# Patient Record
Sex: FEMALE | Race: WHITE | NOT HISPANIC OR LATINO
[De-identification: names, ages, dates, MRNs, and addresses within clinical notes are randomized per-mention and may not be internally consistent; named-entity substitution may affect disease eponyms.]

---

## 2021-06-04 ENCOUNTER — APPOINTMENT (OUTPATIENT)
Dept: UROGYNECOLOGY | Facility: CLINIC | Age: 26
End: 2021-06-04
Payer: COMMERCIAL

## 2021-06-04 ENCOUNTER — OUTPATIENT (OUTPATIENT)
Dept: OUTPATIENT SERVICES | Facility: HOSPITAL | Age: 26
LOS: 1 days | Discharge: HOME | End: 2021-06-04

## 2021-06-04 VITALS
HEIGHT: 63 IN | BODY MASS INDEX: 35.79 KG/M2 | WEIGHT: 202 LBS | DIASTOLIC BLOOD PRESSURE: 76 MMHG | SYSTOLIC BLOOD PRESSURE: 120 MMHG

## 2021-06-04 DIAGNOSIS — F19.90 OTHER PSYCHOACTIVE SUBSTANCE USE, UNSPECIFIED, UNCOMPLICATED: ICD-10-CM

## 2021-06-04 DIAGNOSIS — Z83.3 FAMILY HISTORY OF DIABETES MELLITUS: ICD-10-CM

## 2021-06-04 DIAGNOSIS — N89.8 OTHER SPECIFIED NONINFLAMMATORY DISORDERS OF VAGINA: ICD-10-CM

## 2021-06-04 DIAGNOSIS — Z82.49 FAMILY HISTORY OF ISCHEMIC HEART DISEASE AND OTHER DISEASES OF THE CIRCULATORY SYSTEM: ICD-10-CM

## 2021-06-04 DIAGNOSIS — K59.00 CONSTIPATION, UNSPECIFIED: ICD-10-CM

## 2021-06-04 DIAGNOSIS — F41.9 ANXIETY DISORDER, UNSPECIFIED: ICD-10-CM

## 2021-06-04 DIAGNOSIS — N31.9 NEUROMUSCULAR DYSFUNCTION OF BLADDER, UNSPECIFIED: ICD-10-CM

## 2021-06-04 DIAGNOSIS — Z86.69 PERSONAL HISTORY OF OTHER DISEASES OF THE NERVOUS SYSTEM AND SENSE ORGANS: ICD-10-CM

## 2021-06-04 DIAGNOSIS — L98.8 OTHER SPECIFIED DISORDERS OF THE SKIN AND SUBCUTANEOUS TISSUE: ICD-10-CM

## 2021-06-04 DIAGNOSIS — N39.46 MIXED INCONTINENCE: ICD-10-CM

## 2021-06-04 PROCEDURE — 99205 OFFICE O/P NEW HI 60 MIN: CPT | Mod: 25

## 2021-06-04 PROCEDURE — 51701 INSERT BLADDER CATHETER: CPT

## 2021-06-04 RX ORDER — TROSPIUM CHLORIDE 20 MG/1
20 TABLET, FILM COATED ORAL
Qty: 60 | Refills: 1 | Status: ACTIVE | COMMUNITY
Start: 2021-06-04 | End: 1900-01-01

## 2021-06-04 RX ORDER — NITROFURANTOIN MACROCRYSTAL 100 MG
100 CAPSULE ORAL
Refills: 0 | Status: ACTIVE | COMMUNITY

## 2021-06-05 NOTE — COUNSELING
[FreeTextEntry1] : \par Please stop the macrobid\par \par We will notify you of the vaginal culture results if they are abnormal\par \par Please do not start antibiotics for bacteria in the urine unless you have pain or burning or blood in the urine or an increase of incontinence or fevers\par \par For 4-5 days, cut one item from the list out of your diet.\par \par After 4-5 days, it it makes a difference, you have to decide if it is worth keeping it out of your diet to help with the urinary issues.\par \par If it does not make a difference, you should add it back into your diet and remove another item for another 4-5 days.\par \par Bowel recipe every night for stool control\par \par Please start the trospium (bladder medication) twice a day for the urgency and leakage without warning\par \par Please call my office if you have any issues with the cost or side effects of the medication.\par \par Please schedule a pessary fitting with my Janina RAMÍREZ\par \par Please schedule a 6 week med check with my PAJanina (YISSEL)\par \par Referral to gynecology upstairs\par Dr Gonzalez\par [facility]\par [address] [facility]\par \par

## 2021-06-05 NOTE — PHYSICAL EXAM
[Chaperone Present] : A chaperone was present in the examining room during all aspects of the physical examination [FreeTextEntry1] : Void:  unable to void (last cathed around 1.5 hours ago)\par Cath: 40  cc\par Urethra was prepped in sterile fashion and then a sterile catheter was used by me to drain the bladder.\par \par Currently has her menses\par Well healed incision: vertical\par absent perineal sensation\par absent perineal reflexes\par negative cough stress test\par negative atrophy\par no prolapse\par positive urethral hypermobility\par bilateral levator ani spasm,  no tenderness\par no urethral tenderness\par no bladder tenderness\par no cervical tenderness\par 1/5 Kegel\par

## 2021-06-05 NOTE — DISCUSSION/SUMMARY
[FreeTextEntry1] : \par Neurogenic bladder-\par Advised the patient to continue to self cath every 4 hours. Advised no treatment for bacteria in the urine unless she is symptomatic (pain, dysuria, hematuria, increase of incontinence, fevers). The patient voiced understanding and agrees to the plan.\par \par Mixed incontinence-\par The pathophysiology of the above condition was discussed with the patient. The management options for stress incontinence were discussed including observation, pessary placement, or surgery (midurethral sling). The patient voiced understanding and agrees with pessary management. She will be scheduled for a pessary fitting.\par \par The patient was given information and education on pelvic floor muscle exercises/rehabilitation, avoidance of dietary bladder irritants, and other strategies to improve bladder control, such as scheduled voiding. She was counseled regarding further management strategies for overactive bladder and urge incontinence including pelvic floor physical therapy, medications or surgical management. The patient voiced understanding and agrees with diet changes and medical management. The risks and benefits of trospium was reviewed.\par \par Constipation-\par The patient was counseled about her defecatory dysfunction. We discussed the importance of fiber, hydration and exercise. She was given a handout with specific information about dietary fiber and ways to relieve constipation.\par \par Vaginal discharge-\par Will send a vaginal culture for testing and will treat if indicated.\par

## 2021-06-05 NOTE — HISTORY OF PRESENT ILLNESS
[FreeTextEntry1] : \par Pt with pelvic floor dysfunction here for urogynecologic evaluation. 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 She then calls her primary who prescribed antibiotics. Is currently on Day 2/7 of macrobid.\par \par abusing xanax at this time, is going to go into rehab\par \par Pelvic organ prolapse: s/p MACE procedure, no bulge, denies splinting\par Stress urinary incontinence: as above\par Overactive bladder syndrome: as above, no glaucoma\par Lower urinary tract/vaginal symptoms: as above UTIs per year, no hematuria, no dysuria, no bladder pain \par Fecal incontinence: denies\par Defecatory dysfunction: as above\par Sexual dysfunction: denies pain, reports incontinence with penetration and orgasm, more with orgasm\par Pelvic pain: denies\par Vaginal dryness : +discharge but has her menses today\par \par Her pelvic floor symptoms are significantly bothersome and negatively impacting her quality of life. \par \par

## 2021-06-10 DIAGNOSIS — N89.8 OTHER SPECIFIED NONINFLAMMATORY DISORDERS OF VAGINA: ICD-10-CM

## 2021-06-10 DIAGNOSIS — N39.46 MIXED INCONTINENCE: ICD-10-CM

## 2021-06-10 DIAGNOSIS — N31.9 NEUROMUSCULAR DYSFUNCTION OF BLADDER, UNSPECIFIED: ICD-10-CM

## 2021-06-10 DIAGNOSIS — K59.00 CONSTIPATION, UNSPECIFIED: ICD-10-CM

## 2021-07-13 ENCOUNTER — APPOINTMENT (OUTPATIENT)
Dept: UROGYNECOLOGY | Facility: CLINIC | Age: 26
End: 2021-07-13

## 2021-07-23 ENCOUNTER — APPOINTMENT (OUTPATIENT)
Dept: UROGYNECOLOGY | Facility: CLINIC | Age: 26
End: 2021-07-23

## 2021-12-23 ENCOUNTER — OUTPATIENT (OUTPATIENT)
Dept: OUTPATIENT SERVICES | Facility: HOSPITAL | Age: 26
LOS: 1 days | Discharge: HOME | End: 2021-12-23
Payer: COMMERCIAL

## 2021-12-23 DIAGNOSIS — R22.0 LOCALIZED SWELLING, MASS AND LUMP, HEAD: ICD-10-CM

## 2021-12-23 PROCEDURE — 76536 US EXAM OF HEAD AND NECK: CPT | Mod: 26

## 2022-06-04 ENCOUNTER — OUTPATIENT (OUTPATIENT)
Dept: OUTPATIENT SERVICES | Facility: HOSPITAL | Age: 27
LOS: 1 days | Discharge: HOME | End: 2022-06-04
Payer: COMMERCIAL

## 2022-06-04 DIAGNOSIS — R33.9 RETENTION OF URINE, UNSPECIFIED: ICD-10-CM

## 2022-06-04 PROCEDURE — 76775 US EXAM ABDO BACK WALL LIM: CPT | Mod: 26

## 2022-08-04 ENCOUNTER — APPOINTMENT (OUTPATIENT)
Dept: CARDIOLOGY | Facility: CLINIC | Age: 27
End: 2022-08-04

## 2023-05-22 ENCOUNTER — EMERGENCY (EMERGENCY)
Facility: HOSPITAL | Age: 28
LOS: 0 days | Discharge: HOME | End: 2023-05-23
Attending: EMERGENCY MEDICINE
Payer: COMMERCIAL

## 2023-05-22 ENCOUNTER — EMERGENCY (EMERGENCY)
Facility: HOSPITAL | Age: 28
LOS: 0 days | Discharge: HOME | End: 2023-05-22
Attending: STUDENT IN AN ORGANIZED HEALTH CARE EDUCATION/TRAINING PROGRAM
Payer: COMMERCIAL

## 2023-05-22 VITALS
SYSTOLIC BLOOD PRESSURE: 127 MMHG | RESPIRATION RATE: 18 BRPM | TEMPERATURE: 98 F | HEART RATE: 70 BPM | DIASTOLIC BLOOD PRESSURE: 62 MMHG | OXYGEN SATURATION: 99 %

## 2023-05-22 VITALS
RESPIRATION RATE: 19 BRPM | HEART RATE: 90 BPM | WEIGHT: 250 LBS | DIASTOLIC BLOOD PRESSURE: 86 MMHG | SYSTOLIC BLOOD PRESSURE: 136 MMHG | TEMPERATURE: 98 F | OXYGEN SATURATION: 99 %

## 2023-05-22 DIAGNOSIS — Z87.448 PERSONAL HISTORY OF OTHER DISEASES OF URINARY SYSTEM: ICD-10-CM

## 2023-05-22 DIAGNOSIS — M79.672 PAIN IN LEFT FOOT: ICD-10-CM
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
## 2023-05-22 DIAGNOSIS — S99.812A OTHER SPECIFIED INJURIES OF LEFT ANKLE, INITIAL ENCOUNTER: ICD-10-CM
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
## 2023-05-22 DIAGNOSIS — M25.572 PAIN IN LEFT ANKLE AND JOINTS OF LEFT FOOT: ICD-10-CM

## 2023-05-22 PROCEDURE — 99053 MED SERV 10PM-8AM 24 HR FAC: CPT

## 2023-05-22 PROCEDURE — 73630 X-RAY EXAM OF FOOT: CPT | Mod: 26,LT

## 2023-05-22 PROCEDURE — 99284 EMERGENCY DEPT VISIT MOD MDM: CPT | Mod: 25

## 2023-05-22 PROCEDURE — 73630 X-RAY EXAM OF FOOT: CPT | Mod: LT

## 2023-05-22 PROCEDURE — 73610 X-RAY EXAM OF ANKLE: CPT | Mod: 26,LT

## 2023-05-22 PROCEDURE — 73610 X-RAY EXAM OF ANKLE: CPT | Mod: LT

## 2023-05-22 PROCEDURE — 99283 EMERGENCY DEPT VISIT LOW MDM: CPT

## 2023-05-22 PROCEDURE — 99284 EMERGENCY DEPT VISIT MOD MDM: CPT

## 2023-05-22 PROCEDURE — 73630 X-RAY EXAM OF FOOT: CPT | Mod: 26,LT,76

## 2023-05-22 PROCEDURE — 96372 THER/PROPH/DIAG INJ SC/IM: CPT | Mod: XU

## 2023-05-22 PROCEDURE — 29515 APPLICATION SHORT LEG SPLINT: CPT | Mod: LT

## 2023-05-22 RX ORDER — KETOROLAC TROMETHAMINE 30 MG/ML
30 SYRINGE (ML) INJECTION ONCE
Refills: 0 | Status: DISCONTINUED | OUTPATIENT
Start: 2023-05-22 | End: 2023-05-22

## 2023-05-22 RX ORDER — ACETAMINOPHEN 500 MG
975 TABLET ORAL ONCE
Refills: 0 | Status: COMPLETED | OUTPATIENT
Start: 2023-05-22 | End: 2023-05-22

## 2023-05-22 RX ADMIN — Medication 30 MILLIGRAM(S): at 23:12

## 2023-05-22 RX ADMIN — Medication 975 MILLIGRAM(S): at 03:19

## 2023-05-22 NOTE — ED PROVIDER NOTE - NSICDXPASTSURGICALHX_GEN_ALL_CORE_FT
PAST SURGICAL HISTORY:  Cystic teratoma 1993    Villafuerte procedure 2006    Tethered spinal cord 11/1993

## 2023-05-22 NOTE — ED PROVIDER NOTE - NSICDXPASTMEDICALHX_GEN_ALL_CORE_FT
PAST MEDICAL HISTORY:  Neurogenic bladder self straight cath 4 times a day    Neurogenic bowel s/p galloway procedure, gives self enema every other day    Pilonidal cyst

## 2023-05-22 NOTE — ED PROVIDER NOTE - PROGRESS NOTE DETAILS
Pt aware that we will have official radiology read pending, and may result in change of xray read.  Pt voices understanding of use of medications, instructions for care, and reasons to return or to go to ED  Discussed rest, ice, compression, and elevation with patient.   Discussed NSAIDs for anti-inflammatory and pain relief.  Patient advised to f/u with ortho/podiatry

## 2023-05-22 NOTE — ED PROVIDER NOTE - CLINICAL SUMMARY MEDICAL DECISION MAKING FREE TEXT BOX
PAST MEDICAL HISTORY:  Asthma as a child    Hypothyroid     Morbid obesity 28 yr old f that presents with L foot pain. imaging, will place in hard sole shoe,. will dc pt with orthopedic follow up and strict return precautions.  Imaging was ordered and reviewed by me.  Appropriate medications for patient's presenting complaints were ordered and effects were reassessed.  Patient's records (prior hospital, ED visit, and/or nursing home notes if available) were reviewed.  Additional history was obtained from EMS, family, and/or PCP (where available).  Escalation to admission/observation was considered.  However patient feels much better and is comfortable with discharge.  Appropriate follow-up was arranged.     I have discussed the discharge plan with the patient. The patient agrees with the plan, as discussed.  The patient understands Emergency Department diagnosis is a preliminary diagnosis often based on limited information and that the patient must adhere to the follow-up plan as discussed.  The patient understands that if the symptoms worsen or if prescribed medications do not have the desired/planned effect that the patient may return to the Emergency Department at any time for further evaluation and treatment.

## 2023-05-22 NOTE — ED PROVIDER NOTE - PATIENT PORTAL LINK FT
You can access the FollowMyHealth Patient Portal offered by [facility] by registering at the following website: [email or web address]. By joining [facility]’s FollowMyHealth portal, you will also be able to view your health information using other applications (apps) compatible with our system.

## 2023-05-22 NOTE — ED ADULT NURSE NOTE - NSFALLRISKINTERV_ED_ALL_ED

## 2023-05-22 NOTE — ED PROVIDER NOTE - WORK/EXCUSE FORM DATE
Medical Necessity Clause: This procedure was medically necessary because the lesions that were treated were: 24-May-2023

## 2023-05-22 NOTE — ED PROVIDER NOTE - NS ED ATTENDING STATEMENT MOD
This was a shared visit with the OWEN. I reviewed and verified the documentation and independently performed the documented:

## 2023-05-22 NOTE — ED PROVIDER NOTE - CLINICAL SUMMARY MEDICAL DECISION MAKING FREE TEXT BOX
Patient presents with left foot pain. xray shows lisfranc fracture. Podiatry consulted and recommending outpatient surgery. Splint applied. Discharged with podiatry follow up and return precautions.
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
## 2023-05-22 NOTE — ED PROVIDER NOTE - PROGRESS NOTE DETAILS
Podiatry has been consulted and evaluated patient.  Patient was given option to be admitted to the hospital for surgery or follow-up with podiatry outpatient for surgery outpatient.  Patient wants to go home and follow-up with podiatry outpatient.  Posterior leg splint applied.  Patient is stable for discharge.

## 2023-05-22 NOTE — ED PROVIDER NOTE - PHYSICAL EXAMINATION
CONSTITUTIONAL: Well-appearing; well-nourished; in no apparent distress.   NECK: Supple; non-tender; no cervical lymphadenopathy.   CARDIOVASCULAR: Normal S1, S2; no murmurs, rubs, or gallops.   RESPIRATORY: Normal chest excursion with respiration  GI/: Non-distended; non-tender; no palpable organomegaly.   MS: ttp over medial L foot and lateral L ankle; No evidence of trauma or deformity. Normal ROM in all other joints/extremities and otherwise non-tender to palpation; distal pulses are normal.   SKIN: Normal for age and race; warm; dry; good turgor; no apparent lesions or exudate.   NEURO/PSYCH: A & O x 4; grossly unremarkable. mood and manner are appropriate.

## 2023-05-22 NOTE — ED PROVIDER NOTE - NSFOLLOWUPCLINICS_GEN_ALL_ED_FT
[facility]  Orthpedic  [address], [place]   Phone: [phone]  Fax:     [facility]  Podiatry  .  [place]   Phone: [phone]  Fax:
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
## 2023-05-22 NOTE — ED PROVIDER NOTE - PATIENT PORTAL LINK FT
You can access the FollowMyHealth Patient Portal offered by [facility] by registering at the following website: [email or web address]. By joining [facility]’s FollowMyHealth portal, you will also be able to view your health information using other applications (apps) compatible with our system.

## 2023-05-22 NOTE — ED PROVIDER NOTE - NSFOLLOWUPCLINICS_GEN_ALL_ED_FT
[facility]  Podiatry  .  [place]   Phone: [phone]  Fax:   Follow Up Time: 1-3 Days
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
## 2023-05-22 NOTE — ED PROVIDER NOTE - PHYSICAL EXAMINATION
CONSTITUTIONAL: in no apparent distress.   ENT: Hearing is intact with good acuity to spoken voice.  Patient is speaking clearly, not muffled and airway is intact.   RESPIRATORY: No signs of respiratory distress.   CARDIOVASCULAR: Regular rate and rhythm.   MS: L foot with no obvious deformity, but swelling noticed along with ecchymosis; tender to palpation in the metatarsal area; full ROM; sensory function intact; distal pulse present. Rest of the upper and lower extremities unremarkable. NEURO: A & O x 3. Normal speech. No focal deficit.  PSYCHOLOGICAL: Appropriate mood and affect. Good judgement and insight.

## 2023-05-22 NOTE — ED PROVIDER NOTE - NSFOLLOWUPINSTRUCTIONS_ED_ALL_ED_FT
Please make sure to follow up with your primary care doctor in 3 days.    Our Emergency Department Referral Coordinators will be reaching out ot you in the next 24-48 hours from 9:00am to 5:00pm (Monday to Friday) with a follow up appointment. Please expect a phone call from the hospital in that time frame. If you do not receive a call or if you have any questions or concerns, you can reach them at [phone] or [phone].
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
## 2023-05-23 NOTE — ED ADULT NURSE NOTE - NSFALLUNIVINTERV_ED_ALL_ED
Bed/Stretcher in lowest position, wheels locked, appropriate side rails in place/Call bell, personal items and telephone in reach/Instruct patient to call for assistance before getting out of bed/chair/stretcher/Non-slip footwear applied when patient is off stretcher/[place] to call system/Physically safe environment - no spills, clutter or unnecessary equipment/Purposeful proactive rounding/Room/bathroom lighting operational, light cord in reach

## 2023-05-28 ENCOUNTER — TRANSCRIPTION ENCOUNTER (OUTPATIENT)
Age: 28
End: 2023-05-28

## 2023-05-30 ENCOUNTER — APPOINTMENT (OUTPATIENT)
Dept: PODIATRY | Facility: CLINIC | Age: 28
End: 2023-05-30

## 2023-05-31 ENCOUNTER — NON-APPOINTMENT (OUTPATIENT)
Age: 28
End: 2023-05-31

## 2023-06-01 ENCOUNTER — OUTPATIENT (OUTPATIENT)
Dept: OUTPATIENT SERVICES | Facility: HOSPITAL | Age: 28
LOS: 1 days | End: 2023-06-01
Payer: MEDICAID

## 2023-06-01 ENCOUNTER — APPOINTMENT (OUTPATIENT)
Dept: PODIATRY | Facility: CLINIC | Age: 28
End: 2023-06-01
Payer: MEDICAID

## 2023-06-01 VITALS
BODY MASS INDEX: 40.48 KG/M2 | HEART RATE: 80 BPM | WEIGHT: 220 LBS | DIASTOLIC BLOOD PRESSURE: 67 MMHG | SYSTOLIC BLOOD PRESSURE: 113 MMHG | HEIGHT: 62 IN
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
## 2023-06-01 DIAGNOSIS — Z00.00 ENCOUNTER FOR GENERAL ADULT MEDICAL EXAMINATION WITHOUT ABNORMAL FINDINGS: ICD-10-CM

## 2023-06-01 DIAGNOSIS — S93.325A DISLOCATION OF TARSOMETATARSAL JOINT OF LEFT FOOT, INITIAL ENCOUNTER: ICD-10-CM

## 2023-06-01 PROCEDURE — 99204 OFFICE O/P NEW MOD 45 MIN: CPT | Mod: GC

## 2023-06-01 PROCEDURE — 99204 OFFICE O/P NEW MOD 45 MIN: CPT

## 2023-06-01 NOTE — PHYSICAL EXAM
[General Appearance - Alert] : alert [General Appearance - In No Acute Distress] : in no acute distress [General Appearance - Well Nourished] : well nourished [General Appearance - Well Developed] : well developed [Ankle Swelling (On Exam)] : present [Ankle Swelling On The Left] : of the left ankle [Varicose Veins Of The Left Leg] : on the left foot/ankle [2+] : left foot dorsalis pedis 2+ [Skin Color & Pigmentation] : normal skin color and pigmentation [Skin Turgor] : normal skin turgor [Skin Lesions] : no skin lesions [Sensation] : the sensory exam was normal to light touch and pinprick [Varicose Veins Of Lower Extremities] : not present [] : not present [Delayed in the Left Toes] : capillary refills normal in the left toes [de-identified] : No gross skeletal deformities\par -Pain with passive forefoot abduction\par -Mild ain with passive 1st ray dorsiflexion/plantarflexion\par -TTP 2nd TMTJ, Lisfranc joint, MT heads 1-3, Left foot  [Foot Ulcer] : no foot ulcer [Skin Induration] : no skin induration [FreeTextEntry1] : M

## 2023-06-01 NOTE — ASSESSMENT
[Verbal] : verbal [Patient] : patient [Good - alert, interested, motivated] : Good - alert, interested, motivated [Demonstrates independently] : demonstrates independently [FreeTextEntry1] : Assessment:\par -Lisfranc fracture, Left\par \par Plan:\par -Pt seen & evaluated\par -CT-L foot -> sx planning, eval for comminution\par -Sx scheduled 6/31/23, ORIF Lisfranc L foot, possible 1st/2nd TMTJ arthrodesis. \par -Risks, benefits, and alternatives discussed w/pt. Pt agrees to proceed w/sx.\par -No earlier sx availability. Will attempt to reschedule if times are available\par -WBAT-L in supportive shoegear\par -RTC 1 week post-op

## 2023-06-01 NOTE — REASON FOR VISIT
[Initial Visit] : an initial visit for [Confirmed Foot Fracture] : confirmed foot fracture [Parent] : parent

## 2023-06-01 NOTE — REVIEW OF SYSTEMS
[Limb Pain] : limb pain [Limb Swelling] : limb swelling [Difficulty Walking] : difficulty walking [Negative] : Integumentary [Arthralgias] : no arthralgias [Joint Swelling] : no joint swelling [Joint Stiffness] : no joint stiffness [Limb Weakness] : no limb weakness

## 2023-06-01 NOTE — END OF VISIT
[] : Resident [FreeTextEntry3] : patient here for management of left foot lisfranc dislocation w/ 2nd metatarsal base fracture. Patient presented to the ED 5/22 but declined admission for ORIF and preferred outpatient follow up. Patient was d/c with posterior splint and crutches. Patient presents without crutches and splint; patient fully weightbearing with sandals. Reports pain with WB.\par xrays reviewed and discussed with patient\par Patient requires ORIF for lisfranc dislocation. Risks of surgery discussed. Patient was informed that she may need repeat surgery for hardware removal or an arthrodesis if she has persistent post op pain.\par rx for rolling knee walker\par pt should remain NWB until surgery\par referred for CT scan\par pt scheduled for for 6/21. WIll r/s for an earlier date pending OR availability
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
## 2023-06-02 DIAGNOSIS — S93.325A DISLOCATION OF TARSOMETATARSAL JOINT OF LEFT FOOT, INITIAL ENCOUNTER: ICD-10-CM

## 2023-06-02 DIAGNOSIS — M79.672 PAIN IN LEFT FOOT: ICD-10-CM

## 2023-06-08 ENCOUNTER — OUTPATIENT (OUTPATIENT)
Dept: OUTPATIENT SERVICES | Facility: HOSPITAL | Age: 28
LOS: 1 days | End: 2023-06-08
Payer: MEDICAID

## 2023-06-08 VITALS
SYSTOLIC BLOOD PRESSURE: 127 MMHG | HEART RATE: 89 BPM | WEIGHT: 213.85 LBS | HEIGHT: 61 IN | DIASTOLIC BLOOD PRESSURE: 76 MMHG | TEMPERATURE: 98 F | RESPIRATION RATE: 16 BRPM | OXYGEN SATURATION: 98 %

## 2023-06-08 DIAGNOSIS — Z01.818 ENCOUNTER FOR OTHER PREPROCEDURAL EXAMINATION: ICD-10-CM

## 2023-06-08 DIAGNOSIS — S93.324A DISLOCATION OF TARSOMETATARSAL JOINT OF RIGHT FOOT, INITIAL ENCOUNTER: ICD-10-CM

## 2023-06-08 DIAGNOSIS — S93.325D DISLOCATION OF TARSOMETATARSAL JOINT OF LEFT FOOT, SUBSEQUENT ENCOUNTER: ICD-10-CM

## 2023-06-08 LAB
ALBUMIN SERPL ELPH-MCNC: 4.8 G/DL — SIGNIFICANT CHANGE UP (ref 3.5–5.2)
ALP SERPL-CCNC: 85 U/L — SIGNIFICANT CHANGE UP (ref 30–115)
ALT FLD-CCNC: 87 U/L — HIGH (ref 0–41)
ANION GAP SERPL CALC-SCNC: 14 MMOL/L — SIGNIFICANT CHANGE UP (ref 7–14)
APPEARANCE UR: ABNORMAL
AST SERPL-CCNC: 34 U/L — SIGNIFICANT CHANGE UP (ref 0–41)
BACTERIA # UR AUTO: ABNORMAL
BASOPHILS # BLD AUTO: 0.04 K/UL — SIGNIFICANT CHANGE UP (ref 0–0.2)
BASOPHILS NFR BLD AUTO: 0.3 % — SIGNIFICANT CHANGE UP (ref 0–1)
BILIRUB SERPL-MCNC: 1.7 MG/DL — HIGH (ref 0.2–1.2)
BILIRUB UR-MCNC: NEGATIVE — SIGNIFICANT CHANGE UP
BUN SERPL-MCNC: 14 MG/DL — SIGNIFICANT CHANGE UP (ref 10–20)
CALCIUM SERPL-MCNC: 9.9 MG/DL — SIGNIFICANT CHANGE UP (ref 8.4–10.5)
CHLORIDE SERPL-SCNC: 103 MMOL/L — SIGNIFICANT CHANGE UP (ref 98–110)
CO2 SERPL-SCNC: 21 MMOL/L — SIGNIFICANT CHANGE UP (ref 17–32)
COLOR SPEC: YELLOW — SIGNIFICANT CHANGE UP
CREAT SERPL-MCNC: 0.7 MG/DL — SIGNIFICANT CHANGE UP (ref 0.7–1.5)
DIFF PNL FLD: ABNORMAL
EGFR: 119 ML/MIN/1.73M2 — SIGNIFICANT CHANGE UP
EOSINOPHIL # BLD AUTO: 0.21 K/UL — SIGNIFICANT CHANGE UP (ref 0–0.7)
EOSINOPHIL NFR BLD AUTO: 1.6 % — SIGNIFICANT CHANGE UP (ref 0–8)
EPI CELLS # UR: >27 /HPF — HIGH (ref 0–5)
GLUCOSE SERPL-MCNC: 97 MG/DL — SIGNIFICANT CHANGE UP (ref 70–99)
GLUCOSE UR QL: NEGATIVE — SIGNIFICANT CHANGE UP
HCT VFR BLD CALC: 40.7 % — SIGNIFICANT CHANGE UP (ref 37–47)
HGB BLD-MCNC: 12.9 G/DL — SIGNIFICANT CHANGE UP (ref 12–16)
HYALINE CASTS # UR AUTO: 18 /LPF — HIGH (ref 0–7)
IMM GRANULOCYTES NFR BLD AUTO: 0.8 % — HIGH (ref 0.1–0.3)
KETONES UR-MCNC: NEGATIVE — SIGNIFICANT CHANGE UP
LEUKOCYTE ESTERASE UR-ACNC: ABNORMAL
LYMPHOCYTES # BLD AUTO: 17 % — LOW (ref 20.5–51.1)
LYMPHOCYTES # BLD AUTO: 2.25 K/UL — SIGNIFICANT CHANGE UP (ref 1.2–3.4)
MCHC RBC-ENTMCNC: 24.9 PG — LOW (ref 27–31)
MCHC RBC-ENTMCNC: 31.7 G/DL — LOW (ref 32–37)
MCV RBC AUTO: 78.6 FL — LOW (ref 81–99)
MONOCYTES # BLD AUTO: 0.9 K/UL — HIGH (ref 0.1–0.6)
MONOCYTES NFR BLD AUTO: 6.8 % — SIGNIFICANT CHANGE UP (ref 1.7–9.3)
NEUTROPHILS # BLD AUTO: 9.77 K/UL — HIGH (ref 1.4–6.5)
NEUTROPHILS NFR BLD AUTO: 73.5 % — SIGNIFICANT CHANGE UP (ref 42.2–75.2)
NITRITE UR-MCNC: POSITIVE
NRBC # BLD: 0 /100 WBCS — SIGNIFICANT CHANGE UP (ref 0–0)
PH UR: 6 — SIGNIFICANT CHANGE UP (ref 5–8)
PLATELET # BLD AUTO: 387 K/UL — SIGNIFICANT CHANGE UP (ref 130–400)
PMV BLD: 12.6 FL — HIGH (ref 7.4–10.4)
POTASSIUM SERPL-MCNC: 4.4 MMOL/L — SIGNIFICANT CHANGE UP (ref 3.5–5)
POTASSIUM SERPL-SCNC: 4.4 MMOL/L — SIGNIFICANT CHANGE UP (ref 3.5–5)
PROT SERPL-MCNC: 8.1 G/DL — HIGH (ref 6–8)
PROT UR-MCNC: ABNORMAL
RBC # BLD: 5.18 M/UL — SIGNIFICANT CHANGE UP (ref 4.2–5.4)
RBC # FLD: 14.6 % — HIGH (ref 11.5–14.5)
RBC CASTS # UR COMP ASSIST: 6 /HPF — HIGH (ref 0–4)
SODIUM SERPL-SCNC: 138 MMOL/L — SIGNIFICANT CHANGE UP (ref 135–146)
SP GR SPEC: 1.03 — SIGNIFICANT CHANGE UP (ref 1.01–1.03)
UROBILINOGEN FLD QL: SIGNIFICANT CHANGE UP
WBC # BLD: 13.27 K/UL — HIGH (ref 4.8–10.8)
WBC # FLD AUTO: 13.27 K/UL — HIGH (ref 4.8–10.8)
WBC UR QL: >720 /HPF — HIGH (ref 0–5)

## 2023-06-08 PROCEDURE — 36415 COLL VENOUS BLD VENIPUNCTURE: CPT

## 2023-06-08 PROCEDURE — 85025 COMPLETE CBC W/AUTO DIFF WBC: CPT

## 2023-06-08 PROCEDURE — 93005 ELECTROCARDIOGRAM TRACING: CPT

## 2023-06-08 PROCEDURE — 87186 SC STD MICRODIL/AGAR DIL: CPT

## 2023-06-08 PROCEDURE — 87086 URINE CULTURE/COLONY COUNT: CPT

## 2023-06-08 PROCEDURE — 81001 URINALYSIS AUTO W/SCOPE: CPT

## 2023-06-08 PROCEDURE — 80053 COMPREHEN METABOLIC PANEL: CPT

## 2023-06-08 PROCEDURE — 93010 ELECTROCARDIOGRAM REPORT: CPT

## 2023-06-08 PROCEDURE — 97116 GAIT TRAINING THERAPY: CPT | Mod: GP

## 2023-06-08 PROCEDURE — 99214 OFFICE O/P EST MOD 30 MIN: CPT | Mod: 25

## 2023-06-08 NOTE — H&P PST ADULT - NSICDXPASTMEDICALHX_GEN_ALL_CORE_FT
PAST MEDICAL HISTORY:  Anxiety and depression     Hypertension     Lisfranc dislocation     Neurogenic bladder self straight cath 4 times a day    Neurogenic bowel s/p galloway procedure, gives self enema every other day    Pilonidal cyst
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
## 2023-06-08 NOTE — H&P PST ADULT - REASON FOR ADMISSION
27 y/o female presents to PAST in preparation for ORIF of lisfranc injury left foot in [facility] under GA with Dr. Edwards on 6/15/23

## 2023-06-09 DIAGNOSIS — Z01.818 ENCOUNTER FOR OTHER PREPROCEDURAL EXAMINATION: ICD-10-CM

## 2023-06-09 DIAGNOSIS — S93.325D DISLOCATION OF TARSOMETATARSAL JOINT OF LEFT FOOT, SUBSEQUENT ENCOUNTER: ICD-10-CM

## 2023-06-09 PROBLEM — F41.9 ANXIETY DISORDER, UNSPECIFIED: Chronic | Status: ACTIVE | Noted: 2023-06-08

## 2023-06-09 PROBLEM — S93.326A: Chronic | Status: ACTIVE | Noted: 2023-06-08

## 2023-06-09 PROBLEM — I10 ESSENTIAL (PRIMARY) HYPERTENSION: Chronic | Status: ACTIVE | Noted: 2023-06-08

## 2023-06-10 ENCOUNTER — OUTPATIENT (OUTPATIENT)
Dept: OUTPATIENT SERVICES | Facility: HOSPITAL | Age: 28
LOS: 1 days | End: 2023-06-10
Payer: MEDICAID

## 2023-06-10 ENCOUNTER — RESULT REVIEW (OUTPATIENT)
Age: 28
End: 2023-06-10

## 2023-06-10 DIAGNOSIS — Z00.8 ENCOUNTER FOR OTHER GENERAL EXAMINATION: ICD-10-CM

## 2023-06-10 DIAGNOSIS — M79.673 PAIN IN UNSPECIFIED FOOT: ICD-10-CM

## 2023-06-10 PROCEDURE — 73700 CT LOWER EXTREMITY W/O DYE: CPT | Mod: 26,LT

## 2023-06-10 PROCEDURE — 73700 CT LOWER EXTREMITY W/O DYE: CPT | Mod: LT

## 2023-06-11 DIAGNOSIS — M79.673 PAIN IN UNSPECIFIED FOOT: ICD-10-CM

## 2023-06-12 DIAGNOSIS — N39.0 URINARY TRACT INFECTION, SITE NOT SPECIFIED: ICD-10-CM

## 2023-06-13 ENCOUNTER — NON-APPOINTMENT (OUTPATIENT)
Age: 28
End: 2023-06-13

## 2023-06-14 RX ORDER — AMOXICILLIN 500 MG/1
500 TABLET, FILM COATED ORAL EVERY 8 HOURS
Qty: 21 | Refills: 0 | Status: ACTIVE | COMMUNITY
Start: 2023-06-14 | End: 1900-01-01

## 2023-06-20 ENCOUNTER — APPOINTMENT (OUTPATIENT)
Dept: PODIATRY | Facility: CLINIC | Age: 28
End: 2023-06-20
Payer: SELF-PAY

## 2023-06-20 ENCOUNTER — OUTPATIENT (OUTPATIENT)
Dept: OUTPATIENT SERVICES | Facility: HOSPITAL | Age: 28
LOS: 1 days | End: 2023-06-20

## 2023-06-20 DIAGNOSIS — S93.325D DISLOCATION OF TARSOMETATARSAL JOINT OF LEFT FOOT, SUBSEQUENT ENCOUNTER: ICD-10-CM

## 2023-06-20 DIAGNOSIS — N39.0 URINARY TRACT INFECTION, SITE NOT SPECIFIED: ICD-10-CM

## 2023-06-20 PROCEDURE — 99214 OFFICE O/P EST MOD 30 MIN: CPT | Mod: 57

## 2023-06-20 NOTE — REASON FOR VISIT
[Follow-Up Visit] : a follow-up visit for [Confirmed Foot Fracture] : confirmed foot fracture [Parent] : parent

## 2023-06-20 NOTE — PHYSICAL EXAM
[General Appearance - Alert] : alert [General Appearance - In No Acute Distress] : in no acute distress [General Appearance - Well Nourished] : well nourished [General Appearance - Well Developed] : well developed [Ankle Swelling (On Exam)] : present [Ankle Swelling On The Left] : of the left ankle [Ankle Swelling On The Right] : mild [Varicose Veins Of Lower Extremities] : not present [Varicose Veins Of The Left Leg] : on the left foot/ankle [Delayed in the Left Toes] : capillary refills normal in the left toes [2+] : left foot dorsalis pedis 2+ [de-identified] : No gross skeletal deformities\par -Pain with passive forefoot abduction. pain with piano key test at the midtarsal joint L foot. pain with ROM and palpation on dorsal and plantar midfoot L foot. \par -Mild ain with passive 1st ray dorsiflexion/plantarflexion\par -TTP 2nd TMTJ, Lisfranc joint, MT heads 1-3, Left foot  [Skin Color & Pigmentation] : normal skin color and pigmentation [Skin Turgor] : normal skin turgor [] : no rash [Skin Lesions] : no skin lesions [Foot Ulcer] : no foot ulcer [Skin Induration] : no skin induration [Sensation] : the sensory exam was normal to light touch and pinprick

## 2023-06-20 NOTE — ASSESSMENT
[FreeTextEntry1] : Assessment:\par -Lisfranc fracture/injury , Left\par \par Plan:\par -Pt seen & evaluated\par -CT-L foot -> sx planning, eval for comminution\par -Sx scheduled 6/23/23, ORIF Lisfranc L foot, possible 1st/2nd TMTJ arthrodesis. \par -Risks, benefits, and alternatives discussed w/pt. Pt agrees to proceed w/sx.\par -Cont with Abx, f/u with urine cultures \par -NWB LLE in crutches \par -RTC 1 week post-op\par Patient requires ORIF for lisfranc dislocation. All, benefits, Risks and potential complications of surgery discussed. potential complications include but not limited to long term pain, infection, need for revision, etc.  Patient was informed that she may need repeat surgery for hardware removal or an arthrodesis if she has persistent post op pain.\par rx for rolling knee walker\par pt should remain NWB until surgery\par  [Verbal] : verbal [Patient] : patient [Good - alert, interested, motivated] : Good - alert, interested, motivated [Demonstrates independently] : demonstrates independently
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
## 2023-06-20 NOTE — REVIEW OF SYSTEMS
[Arthralgias] : no arthralgias [Joint Swelling] : no joint swelling [Joint Stiffness] : no joint stiffness [Limb Pain] : limb pain [Limb Swelling] : limb swelling [Limb Weakness] : no limb weakness [Difficulty Walking] : difficulty walking [Negative] : Integumentary

## 2023-06-21 DIAGNOSIS — N39.0 URINARY TRACT INFECTION, SITE NOT SPECIFIED: ICD-10-CM

## 2023-06-23 ENCOUNTER — OUTPATIENT (OUTPATIENT)
Dept: INPATIENT UNIT | Facility: HOSPITAL | Age: 28
LOS: 1 days | Discharge: HOME | End: 2023-06-23
Payer: MEDICAID

## 2023-06-23 ENCOUNTER — TRANSCRIPTION ENCOUNTER (OUTPATIENT)
Age: 28
End: 2023-06-23

## 2023-06-23 VITALS
TEMPERATURE: 96 F | SYSTOLIC BLOOD PRESSURE: 104 MMHG | HEIGHT: 61 IN | HEART RATE: 81 BPM | WEIGHT: 212.97 LBS | OXYGEN SATURATION: 98 % | DIASTOLIC BLOOD PRESSURE: 68 MMHG | RESPIRATION RATE: 18 BRPM

## 2023-06-23 VITALS — HEART RATE: 74 BPM | RESPIRATION RATE: 17 BRPM | DIASTOLIC BLOOD PRESSURE: 96 MMHG | SYSTOLIC BLOOD PRESSURE: 136 MMHG

## 2023-06-23 DIAGNOSIS — S93.325A DISLOCATION OF TARSOMETATARSAL JOINT OF LEFT FOOT, INITIAL ENCOUNTER: ICD-10-CM

## 2023-06-23 PROCEDURE — C9399: CPT

## 2023-06-23 PROCEDURE — 29515 APPLICATION SHORT LEG SPLINT: CPT | Mod: LT,GC,59

## 2023-06-23 PROCEDURE — C1713: CPT

## 2023-06-23 PROCEDURE — 73620 X-RAY EXAM OF FOOT: CPT | Mod: LT

## 2023-06-23 PROCEDURE — 28730 FUSION OF FOOT BONES: CPT | Mod: LT,GC

## 2023-06-23 PROCEDURE — C1769: CPT

## 2023-06-23 PROCEDURE — 76000 FLUOROSCOPY <1 HR PHYS/QHP: CPT | Mod: 26,GC

## 2023-06-23 RX ORDER — ONDANSETRON 8 MG/1
4 TABLET, FILM COATED ORAL ONCE
Refills: 0 | Status: DISCONTINUED | OUTPATIENT
Start: 2023-06-23 | End: 2023-06-23

## 2023-06-23 RX ORDER — SODIUM CHLORIDE 9 MG/ML
1000 INJECTION, SOLUTION INTRAVENOUS
Refills: 0 | Status: DISCONTINUED | OUTPATIENT
Start: 2023-06-23 | End: 2023-06-23

## 2023-06-23 RX ORDER — OXYCODONE AND ACETAMINOPHEN 5; 325 MG/1; MG/1
5-325 TABLET ORAL
Qty: 28 | Refills: 0 | Status: ACTIVE | COMMUNITY
Start: 2023-06-23 | End: 1900-01-01

## 2023-06-23 RX ORDER — HYDROMORPHONE HYDROCHLORIDE 2 MG/ML
0.5 INJECTION INTRAMUSCULAR; INTRAVENOUS; SUBCUTANEOUS
Refills: 0 | Status: DISCONTINUED | OUTPATIENT
Start: 2023-06-23 | End: 2023-06-23

## 2023-06-23 RX ORDER — AMOXICILLIN AND CLAVULANATE POTASSIUM 875; 125 MG/1; MG/1
875-125 TABLET, COATED ORAL
Qty: 14 | Refills: 0 | Status: ACTIVE | COMMUNITY
Start: 2023-06-23 | End: 1900-01-01

## 2023-06-23 RX ORDER — OXYCODONE AND ACETAMINOPHEN 5; 325 MG/1; MG/1
1 TABLET ORAL ONCE
Refills: 0 | Status: DISCONTINUED | OUTPATIENT
Start: 2023-06-23 | End: 2023-06-23

## 2023-06-23 RX ORDER — HYDROMORPHONE HYDROCHLORIDE 2 MG/ML
1 INJECTION INTRAMUSCULAR; INTRAVENOUS; SUBCUTANEOUS
Refills: 0 | Status: DISCONTINUED | OUTPATIENT
Start: 2023-06-23 | End: 2023-06-23

## 2023-06-23 RX ORDER — OXYCODONE AND ACETAMINOPHEN 5; 325 MG/1; MG/1
2 TABLET ORAL ONCE
Refills: 0 | Status: DISCONTINUED | OUTPATIENT
Start: 2023-06-23 | End: 2023-06-23

## 2023-06-23 NOTE — ASU PATIENT PROFILE, ADULT - NSICDXPASTSURGICALHX_GEN_ALL_CORE_FT
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

## 2023-06-23 NOTE — ASU PATIENT PROFILE, ADULT - NSICDXPASTMEDICALHX_GEN_ALL_CORE_FT
PAST MEDICAL HISTORY:  Anxiety and depression     Hypertension     Lisfranc dislocation     Neurogenic bladder self straight cath 4 times a day    Neurogenic bowel s/p galloway procedure, gives self enema every other day via umbilicus    Pilonidal cyst

## 2023-06-23 NOTE — ASU DISCHARGE PLAN (ADULT/PEDIATRIC) - CALL YOUR DOCTOR IF YOU HAVE ANY OF THE FOLLOWING:
Bleeding that does not stop/Swelling that gets worse/Pain not relieved by Medications/Fever greater than (need to indicate Fahrenheit or Celsius)/Unable to urinate/Excessive diarrhea/Inability to tolerate liquids or foods Bleeding that does not stop/Swelling that gets worse/Pain not relieved by Medications/Fever greater than (need to indicate Fahrenheit or Celsius)/Wound/Surgical Site with redness, or foul smelling discharge or pus/Numbness, tingling, color or temperature change to extremity/Nausea and vomiting that does not stop/Unable to urinate/Excessive diarrhea/Inability to tolerate liquids or foods/Increased irritability or sluggishness

## 2023-06-23 NOTE — ASU PATIENT PROFILE, ADULT - ABILITY TO HEAR (WITH HEARING AID OR HEARING APPLIANCE IF NORMALLY USED):
46M w pmhx of DM, HTN, HLD, gout, sleep apnea (CPAP at night) with perforated acute cholecystitis. SICU consulted for sepsis management requiring pressor support.     Plan:  Neuro  - Pain control as needed: IV Tylenol and Toradol    Respiratory: Sleep apnea (CPAP HS)  - IS  - Monitor Spo2  - CPAP overnight for ROCHELLE    Cardiovascular: Sepsis  - Monitor hemodynamic status and markers of perfusion  - Pressor support for hypotension with Levo Vaso; MAP goal over 65    GI: Acute cholecystitis with perforation   - Diet, NPO  - IR consulted to evaluate for Perchole tube; drained 50cc purulent material; daily flush      - Monitor and replete electrolytes as needed  - IV resuscitation with 2x LR bolus and 1L Plasmalyte  - IVF with plasmalyte @ 125  - Monitor I&O; making adequate urine  - Daily weights    Heme  - Monitor H&H; Transfuse as needed <7  - Holding DVT ppx given possible IR intervention    ID: Acute cholecystitis with perforation   - Given Ertapenem in ED x1, changed to Zosyn  - Trend WBC and monitor fever curve  - F/U CX    Endo: DM  - Monitor glucose; maintain under 180 and avoid hypoglycemia  insulin lispro (ADMELOG) corrective regimen sliding scale   every 6 hours    Dispo: Continued SICU monitoring   Adequate: hears normal conversation without difficulty

## 2023-06-23 NOTE — ASU PREOP CHECKLIST - 1.
PT HAS NEUROGENIC BLADDER & SELF CATHS 4X/DAY. PT HAS NEUROGENIC BOWL & SELF ENEMAS EVERY OTHER DAY VIA UMBILICUS.

## 2023-06-26 LAB
APPEARANCE: CLEAR
BILIRUBIN URINE: NEGATIVE
BLOOD URINE: NEGATIVE
COLOR: YELLOW
GLUCOSE QUALITATIVE U: NEGATIVE MG/DL
KETONES URINE: NEGATIVE MG/DL
LEUKOCYTE ESTERASE URINE: NEGATIVE
NITRITE URINE: NEGATIVE
PH URINE: 6
PROTEIN URINE: NEGATIVE MG/DL
SPECIFIC GRAVITY URINE: 1.01
UROBILINOGEN URINE: 0.2 MG/DL

## 2023-06-27 DIAGNOSIS — F41.9 ANXIETY DISORDER, UNSPECIFIED: ICD-10-CM
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
## 2023-06-27 DIAGNOSIS — S93.325A DISLOCATION OF TARSOMETATARSAL JOINT OF LEFT FOOT, INITIAL ENCOUNTER: ICD-10-CM
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
## 2023-06-27 DIAGNOSIS — I10 ESSENTIAL (PRIMARY) HYPERTENSION: ICD-10-CM
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
## 2023-06-27 DIAGNOSIS — F32.9 MAJOR DEPRESSIVE DISORDER, SINGLE EPISODE, UNSPECIFIED: ICD-10-CM
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
## 2023-06-28 ENCOUNTER — APPOINTMENT (OUTPATIENT)
Dept: PODIATRY | Facility: CLINIC | Age: 28
End: 2023-06-28

## 2023-06-29 ENCOUNTER — APPOINTMENT (OUTPATIENT)
Dept: PODIATRY | Facility: CLINIC | Age: 28
End: 2023-06-29
Payer: SELF-PAY

## 2023-06-29 PROCEDURE — 99213 OFFICE O/P EST LOW 20 MIN: CPT | Mod: 24

## 2023-06-29 RX ORDER — OXYCODONE AND ACETAMINOPHEN 5; 325 MG/1; MG/1
5-325 TABLET ORAL
Qty: 6 | Refills: 0 | Status: ACTIVE | COMMUNITY
Start: 2023-06-29 | End: 1900-01-01

## 2023-06-29 NOTE — ASSESSMENT
[FreeTextEntry1] : S/p Lisfranc Fusion L Foot - no sings of infection \par Cont NWB\par Cont Abx \par RICE Protocol\par pain management with pain meds\par \par PRessure sore L Heel\par - WOund care with bacitracin and Xeroform\par - Heel off loading \par \par F/u in 1 week \par

## 2023-06-29 NOTE — HISTORY OF PRESENT ILLNESS
[FreeTextEntry1] : S/p Lisfranc Fusion L Foot\par - NWB LLE in a Scooter\par - Pain L foot and L heel\par - COntrolled with pain meds\par - On abx

## 2023-06-29 NOTE — PHYSICAL EXAM
[Ankle Swelling (On Exam)] : present [Ankle Swelling On The Left] : moderate [Delayed in the Left Toes] : capillary refills normal in the left toes [de-identified] : Mild pain with Foot ROM L FOot [FreeTextEntry1] : Surgical site clean, sutures intact. No drainage, no erythema\par Blister L anterior ankle with breakdown of the skin\par Unstagable L heel wound, ischemic base, Size 2cm x 2cm, painful  [Sensation] : the sensory exam was normal to light touch and pinprick [Motor Exam] : the motor exam was normal

## 2023-07-06 ENCOUNTER — APPOINTMENT (OUTPATIENT)
Dept: PODIATRY | Facility: CLINIC | Age: 28
End: 2023-07-06
Payer: MEDICAID

## 2023-07-06 VITALS — BODY MASS INDEX: 40.48 KG/M2 | WEIGHT: 220 LBS | HEIGHT: 62 IN | TEMPERATURE: 97 F

## 2023-07-06 PROCEDURE — 99213 OFFICE O/P EST LOW 20 MIN: CPT | Mod: 24

## 2023-07-06 NOTE — ASSESSMENT
[FreeTextEntry1] : S/p Lisfranc Fusion L Foot - no sings of infection \par Cont NWB\par Rx CAM boot \par Rx Xray\par Rx Wound SUpplies \par pain management with pain meds\par \par PRessure sore L Heel - improving \par - WOund care with bacitracin and Xeroform 2 times a week\par - Heel off loading \par \par F/u in 2 weeks to remove sutures or sooner of any problems arise \par

## 2023-07-06 NOTE — PHYSICAL EXAM
[Ankle Swelling (On Exam)] : present [Ankle Swelling On The Left] : moderate [Delayed in the Left Toes] : capillary refills normal in the left toes [de-identified] : Mild pain with Foot ROM L FOot [FreeTextEntry1] : Surgical site clean, sutures intact. No drainage, no erythema\par Blister L anterior ankle with breakdown of the skin\par Unstagable L heel wound, ischemic base, Size 2cm x 2cm, painful  [Sensation] : the sensory exam was normal to light touch and pinprick [Motor Exam] : the motor exam was normal

## 2023-07-06 NOTE — HISTORY OF PRESENT ILLNESS
[FreeTextEntry1] : S/p Lisfranc Fusion L Foot\par - NWB LLE in a Scooter\par - mild Pain L foot and L heel

## 2023-07-25 ENCOUNTER — APPOINTMENT (OUTPATIENT)
Dept: PODIATRY | Facility: CLINIC | Age: 28
End: 2023-07-25
Payer: MEDICAID

## 2023-07-25 ENCOUNTER — RESULT REVIEW (OUTPATIENT)
Age: 28
End: 2023-07-25

## 2023-07-25 ENCOUNTER — OUTPATIENT (OUTPATIENT)
Dept: OUTPATIENT SERVICES | Facility: HOSPITAL | Age: 28
LOS: 1 days | End: 2023-07-25
Payer: MEDICAID

## 2023-07-25 DIAGNOSIS — Z98.890 OTHER SPECIFIED POSTPROCEDURAL STATES: ICD-10-CM

## 2023-07-25 DIAGNOSIS — L89.620 PRESSURE ULCER OF LEFT HEEL, UNSTAGEABLE: ICD-10-CM

## 2023-07-25 DIAGNOSIS — M79.672 PAIN IN LEFT FOOT: ICD-10-CM

## 2023-07-25 PROCEDURE — 73630 X-RAY EXAM OF FOOT: CPT | Mod: LT

## 2023-07-25 PROCEDURE — 73630 X-RAY EXAM OF FOOT: CPT | Mod: 26,LT

## 2023-07-25 PROCEDURE — 99213 OFFICE O/P EST LOW 20 MIN: CPT | Mod: 24

## 2023-07-25 RX ORDER — GAUZE BANDAGE 4.5"X147"
BANDAGE TOPICAL
Qty: 1 | Refills: 2 | Status: ACTIVE | COMMUNITY
Start: 2023-07-25 | End: 1900-01-01

## 2023-07-25 RX ORDER — ADHESIVE TAPE 3"X 2.3 YD
4"X4" TAPE, NON-MEDICATED TOPICAL
Qty: 1 | Refills: 2 | Status: ACTIVE | COMMUNITY
Start: 2023-07-25 | End: 1900-01-01

## 2023-07-25 RX ORDER — COLLAGENASE SANTYL 250 [ARB'U]/G
250 OINTMENT TOPICAL DAILY
Qty: 1 | Refills: 4 | Status: ACTIVE | COMMUNITY
Start: 2023-07-25

## 2023-07-25 RX ORDER — COMPR.STOCKING,KNEE,LONG,LARGE
EACH MISCELLANEOUS
Qty: 3 | Refills: 0 | Status: ACTIVE | COMMUNITY
Start: 2023-07-25 | End: 1900-01-01

## 2023-07-25 RX ORDER — COLLAGENASE SANTYL 250 [ARB'U]/G
250 OINTMENT TOPICAL DAILY
Qty: 1 | Refills: 4 | Status: ACTIVE | COMMUNITY
Start: 2023-07-25 | End: 1900-01-01

## 2023-07-25 NOTE — ASSESSMENT
[FreeTextEntry1] : S/p Lisfranc Fusion L Foot - no sings of infection \par - Cont NWB with CAM boot \par - Xray Reviewed with the patient\par - Sutures Removed \par \par PRessure sore L Heel \par - Rx Wound SUpplies \par - Rx Santyl \par - WOund care with Santyl /WTD Daily\par - Heel off loading \par \par F/u in 2 weeks or sooner of any problems arise \par

## 2023-07-25 NOTE — PHYSICAL EXAM
[Ankle Swelling (On Exam)] : present [Ankle Swelling On The Left] : of the left ankle [Ankle Swelling On The Right] : mild [Delayed in the Left Toes] : capillary refills normal in the left toes [de-identified] : Mild pain with Foot ROM L FOot and palpation ovet the incision sites  [FreeTextEntry1] : Surgical site clean, sutures intact. No drainage, no erythema\par Unstagable L heel wound, ischemic base, Size 2cm x 2cm, painful  [Sensation] : the sensory exam was normal to light touch and pinprick [Motor Exam] : the motor exam was normal

## 2023-07-25 NOTE — HISTORY OF PRESENT ILLNESS
[FreeTextEntry1] : S/p Lisfranc Fusion L Foot\par - NWB LLE in a Scooter\par - mild Pain L foot and L heel \par - L Heel pressure wound, dressing at home

## 2023-07-26 DIAGNOSIS — M79.672 PAIN IN LEFT FOOT: ICD-10-CM

## 2023-08-08 ENCOUNTER — APPOINTMENT (OUTPATIENT)
Dept: PODIATRY | Facility: CLINIC | Age: 28
End: 2023-08-08
Payer: MEDICAID

## 2023-08-08 VITALS
DIASTOLIC BLOOD PRESSURE: 70 MMHG | HEART RATE: 108 BPM | SYSTOLIC BLOOD PRESSURE: 118 MMHG | BODY MASS INDEX: 40.48 KG/M2 | OXYGEN SATURATION: 98 % | HEIGHT: 62 IN | WEIGHT: 220 LBS

## 2023-08-08 DIAGNOSIS — L03.116 CELLULITIS OF LEFT LOWER LIMB: ICD-10-CM

## 2023-08-08 PROCEDURE — 11042 DBRDMT SUBQ TIS 1ST 20SQCM/<: CPT | Mod: 78

## 2023-08-08 PROCEDURE — 99024 POSTOP FOLLOW-UP VISIT: CPT

## 2023-08-08 RX ORDER — SULFAMETHOXAZOLE AND TRIMETHOPRIM 800; 160 MG/1; MG/1
800-160 TABLET ORAL TWICE DAILY
Qty: 14 | Refills: 0 | Status: ACTIVE | COMMUNITY
Start: 2023-08-08 | End: 1900-01-01

## 2023-08-08 NOTE — ASSESSMENT
[FreeTextEntry1] : S/p Lisfranc Fusion L Foot - no sings of infection  - Transition to WBAT with CAM boot   PRessure sore L Heel  - Sharp Ex Dbx of soft tissue dwon and including subcutaneous tissue L Heel  - WOund care with Santyl /WTD Daily - Heel off loading  Cellulites  - Rx Abx Pt to be admitted to the hospital for Surgical management of the wound. Discussed all risks, benefits, alternatives and complications. Pt agrees to the plan [Verbal] : verbal [Patient] : patient [Family member] : family member [Good - alert, interested, motivated] : Good - alert, interested, motivated [Demonstrates independently] : demonstrates independently [Dressing changes] : dressing changes [Foot Care] : foot care

## 2023-08-08 NOTE — PHYSICAL EXAM
[Ankle Swelling (On Exam)] : present [Ankle Swelling On The Left] : of the left ankle [Ankle Swelling On The Right] : mild [Delayed in the Left Toes] : capillary refills normal in the left toes [de-identified] : Mild pain with Foot ROM L FOot and palpation ovet the incision sites  [FreeTextEntry1] : Surgical site clean, sutures intact. No drainage, no erythema Unstagable L heel wound, ischemic base, Size 2cm x 2cm, painful. Erythema on the dayday wound [Sensation] : the sensory exam was normal to light touch and pinprick [Motor Exam] : the motor exam was normal

## 2023-08-08 NOTE — HISTORY OF PRESENT ILLNESS
[FreeTextEntry1] : S/p Lisfranc Fusion L Foot - NWB LLE in a Scooter - mild Pain L foot and L heel  - L Heel pressure wound, dressing at home

## 2023-08-13 ENCOUNTER — INPATIENT (INPATIENT)
Facility: HOSPITAL | Age: 28
LOS: 6 days | Discharge: HOME HEALTH | End: 2023-08-20
Attending: HOSPITALIST | Admitting: HOSPITALIST
Payer: MEDICAID

## 2023-08-13 VITALS
DIASTOLIC BLOOD PRESSURE: 82 MMHG | TEMPERATURE: 98 F | OXYGEN SATURATION: 95 % | RESPIRATION RATE: 18 BRPM | SYSTOLIC BLOOD PRESSURE: 114 MMHG | WEIGHT: 220.02 LBS | HEIGHT: 61 IN | HEART RATE: 103 BPM

## 2023-08-13 DIAGNOSIS — L03.90 CELLULITIS, UNSPECIFIED: ICD-10-CM

## 2023-08-13 LAB
ALBUMIN SERPL ELPH-MCNC: 4.8 G/DL — SIGNIFICANT CHANGE UP (ref 3.5–5.2)
ALP SERPL-CCNC: 74 U/L — SIGNIFICANT CHANGE UP (ref 30–115)
ALT FLD-CCNC: 15 U/L — SIGNIFICANT CHANGE UP (ref 0–41)
ANION GAP SERPL CALC-SCNC: 13 MMOL/L — SIGNIFICANT CHANGE UP (ref 7–14)
APTT BLD: 33.2 SEC — SIGNIFICANT CHANGE UP (ref 27–39.2)
AST SERPL-CCNC: 16 U/L — SIGNIFICANT CHANGE UP (ref 0–41)
BASOPHILS # BLD AUTO: 0.06 K/UL — SIGNIFICANT CHANGE UP (ref 0–0.2)
BASOPHILS NFR BLD AUTO: 0.6 % — SIGNIFICANT CHANGE UP (ref 0–1)
BILIRUB SERPL-MCNC: 0.7 MG/DL — SIGNIFICANT CHANGE UP (ref 0.2–1.2)
BLD GP AB SCN SERPL QL: SIGNIFICANT CHANGE UP
BUN SERPL-MCNC: 12 MG/DL — SIGNIFICANT CHANGE UP (ref 10–20)
CALCIUM SERPL-MCNC: 9.9 MG/DL — SIGNIFICANT CHANGE UP (ref 8.4–10.4)
CHLORIDE SERPL-SCNC: 103 MMOL/L — SIGNIFICANT CHANGE UP (ref 98–110)
CO2 SERPL-SCNC: 20 MMOL/L — SIGNIFICANT CHANGE UP (ref 17–32)
CREAT SERPL-MCNC: 1 MG/DL — SIGNIFICANT CHANGE UP (ref 0.7–1.5)
CRP SERPL-MCNC: <3 MG/L — SIGNIFICANT CHANGE UP
EGFR: 78 ML/MIN/1.73M2 — SIGNIFICANT CHANGE UP
EOSINOPHIL # BLD AUTO: 0.37 K/UL — SIGNIFICANT CHANGE UP (ref 0–0.7)
EOSINOPHIL NFR BLD AUTO: 3.9 % — SIGNIFICANT CHANGE UP (ref 0–8)
ERYTHROCYTE [SEDIMENTATION RATE] IN BLOOD: 5 MM/HR — SIGNIFICANT CHANGE UP (ref 0–20)
GLUCOSE SERPL-MCNC: 92 MG/DL — SIGNIFICANT CHANGE UP (ref 70–99)
HCT VFR BLD CALC: 42.1 % — SIGNIFICANT CHANGE UP (ref 37–47)
HGB BLD-MCNC: 13.4 G/DL — SIGNIFICANT CHANGE UP (ref 12–16)
IMM GRANULOCYTES NFR BLD AUTO: 0.4 % — HIGH (ref 0.1–0.3)
INR BLD: 1.03 RATIO — SIGNIFICANT CHANGE UP (ref 0.65–1.3)
LACTATE SERPL-SCNC: 1.6 MMOL/L — SIGNIFICANT CHANGE UP (ref 0.7–2)
LYMPHOCYTES # BLD AUTO: 1.83 K/UL — SIGNIFICANT CHANGE UP (ref 1.2–3.4)
LYMPHOCYTES # BLD AUTO: 19.2 % — LOW (ref 20.5–51.1)
MAGNESIUM SERPL-MCNC: 1.9 MG/DL — SIGNIFICANT CHANGE UP (ref 1.8–2.4)
MCHC RBC-ENTMCNC: 24.6 PG — LOW (ref 27–31)
MCHC RBC-ENTMCNC: 31.8 G/DL — LOW (ref 32–37)
MCV RBC AUTO: 77.4 FL — LOW (ref 81–99)
MONOCYTES # BLD AUTO: 0.61 K/UL — HIGH (ref 0.1–0.6)
MONOCYTES NFR BLD AUTO: 6.4 % — SIGNIFICANT CHANGE UP (ref 1.7–9.3)
NEUTROPHILS # BLD AUTO: 6.64 K/UL — HIGH (ref 1.4–6.5)
NEUTROPHILS NFR BLD AUTO: 69.5 % — SIGNIFICANT CHANGE UP (ref 42.2–75.2)
NRBC # BLD: 0 /100 WBCS — SIGNIFICANT CHANGE UP (ref 0–0)
PLATELET # BLD AUTO: 275 K/UL — SIGNIFICANT CHANGE UP (ref 130–400)
PMV BLD: 13.4 FL — HIGH (ref 7.4–10.4)
POTASSIUM SERPL-MCNC: 4.7 MMOL/L — SIGNIFICANT CHANGE UP (ref 3.5–5)
POTASSIUM SERPL-SCNC: 4.7 MMOL/L — SIGNIFICANT CHANGE UP (ref 3.5–5)
PROT SERPL-MCNC: 7.5 G/DL — SIGNIFICANT CHANGE UP (ref 6–8)
PROTHROM AB SERPL-ACNC: 11.8 SEC — SIGNIFICANT CHANGE UP (ref 9.95–12.87)
RBC # BLD: 5.44 M/UL — HIGH (ref 4.2–5.4)
RBC # FLD: 13.9 % — SIGNIFICANT CHANGE UP (ref 11.5–14.5)
SODIUM SERPL-SCNC: 136 MMOL/L — SIGNIFICANT CHANGE UP (ref 135–146)
WBC # BLD: 9.55 K/UL — SIGNIFICANT CHANGE UP (ref 4.8–10.8)
WBC # FLD AUTO: 9.55 K/UL — SIGNIFICANT CHANGE UP (ref 4.8–10.8)

## 2023-08-13 PROCEDURE — 86901 BLOOD TYPING SEROLOGIC RH(D): CPT

## 2023-08-13 PROCEDURE — 86850 RBC ANTIBODY SCREEN: CPT

## 2023-08-13 PROCEDURE — 80048 BASIC METABOLIC PNL TOTAL CA: CPT

## 2023-08-13 PROCEDURE — 88304 TISSUE EXAM BY PATHOLOGIST: CPT

## 2023-08-13 PROCEDURE — 87075 CULTR BACTERIA EXCEPT BLOOD: CPT

## 2023-08-13 PROCEDURE — 99221 1ST HOSP IP/OBS SF/LOW 40: CPT | Mod: GC,57,24

## 2023-08-13 PROCEDURE — 87015 SPECIMEN INFECT AGNT CONCNTJ: CPT

## 2023-08-13 PROCEDURE — 87070 CULTURE OTHR SPECIMN AEROBIC: CPT

## 2023-08-13 PROCEDURE — G0447 BEHAVIOR COUNSEL OBESITY 15M: CPT

## 2023-08-13 PROCEDURE — 93926 LOWER EXTREMITY STUDY: CPT | Mod: 26,LT

## 2023-08-13 PROCEDURE — 83735 ASSAY OF MAGNESIUM: CPT

## 2023-08-13 PROCEDURE — 99285 EMERGENCY DEPT VISIT HI MDM: CPT

## 2023-08-13 PROCEDURE — 87116 MYCOBACTERIA CULTURE: CPT

## 2023-08-13 PROCEDURE — 85730 THROMBOPLASTIN TIME PARTIAL: CPT

## 2023-08-13 PROCEDURE — 85027 COMPLETE CBC AUTOMATED: CPT

## 2023-08-13 PROCEDURE — 86900 BLOOD TYPING SEROLOGIC ABO: CPT

## 2023-08-13 PROCEDURE — 93971 EXTREMITY STUDY: CPT | Mod: LT

## 2023-08-13 PROCEDURE — 99223 1ST HOSP IP/OBS HIGH 75: CPT

## 2023-08-13 PROCEDURE — 36415 COLL VENOUS BLD VENIPUNCTURE: CPT

## 2023-08-13 PROCEDURE — 87206 SMEAR FLUORESCENT/ACID STAI: CPT

## 2023-08-13 PROCEDURE — 93926 LOWER EXTREMITY STUDY: CPT | Mod: LT

## 2023-08-13 PROCEDURE — 85610 PROTHROMBIN TIME: CPT

## 2023-08-13 PROCEDURE — 87077 CULTURE AEROBIC IDENTIFY: CPT

## 2023-08-13 PROCEDURE — 87102 FUNGUS ISOLATION CULTURE: CPT

## 2023-08-13 PROCEDURE — 84703 CHORIONIC GONADOTROPIN ASSAY: CPT

## 2023-08-13 PROCEDURE — 85025 COMPLETE CBC W/AUTO DIFF WBC: CPT

## 2023-08-13 PROCEDURE — 73630 X-RAY EXAM OF FOOT: CPT | Mod: 26,LT

## 2023-08-13 PROCEDURE — 93971 EXTREMITY STUDY: CPT | Mod: 26,LT

## 2023-08-13 PROCEDURE — 87186 SC STD MICRODIL/AGAR DIL: CPT

## 2023-08-13 PROCEDURE — 71045 X-RAY EXAM CHEST 1 VIEW: CPT

## 2023-08-13 RX ORDER — CEFEPIME 1 G/1
1000 INJECTION, POWDER, FOR SOLUTION INTRAMUSCULAR; INTRAVENOUS ONCE
Refills: 0 | Status: DISCONTINUED | OUTPATIENT
Start: 2023-08-13 | End: 2023-08-13

## 2023-08-13 RX ORDER — MORPHINE SULFATE 50 MG/1
4 CAPSULE, EXTENDED RELEASE ORAL ONCE
Refills: 0 | Status: DISCONTINUED | OUTPATIENT
Start: 2023-08-13 | End: 2023-08-13

## 2023-08-13 RX ORDER — SODIUM CHLORIDE 9 MG/ML
1000 INJECTION, SOLUTION INTRAVENOUS ONCE
Refills: 0 | Status: COMPLETED | OUTPATIENT
Start: 2023-08-13 | End: 2023-08-13

## 2023-08-13 RX ORDER — ESCITALOPRAM OXALATE 10 MG/1
1 TABLET, FILM COATED ORAL
Refills: 0 | DISCHARGE

## 2023-08-13 RX ORDER — COLLAGENASE CLOSTRIDIUM HIST. 250 UNIT/G
1 OINTMENT (GRAM) TOPICAL ONCE
Refills: 0 | Status: COMPLETED | OUTPATIENT
Start: 2023-08-13 | End: 2023-08-13

## 2023-08-13 RX ORDER — HYDROXYZINE HCL 10 MG
1 TABLET ORAL
Refills: 0 | DISCHARGE

## 2023-08-13 RX ORDER — CHLORHEXIDINE GLUCONATE 213 G/1000ML
1 SOLUTION TOPICAL
Refills: 0 | Status: DISCONTINUED | OUTPATIENT
Start: 2023-08-13 | End: 2023-08-14

## 2023-08-13 RX ORDER — ENOXAPARIN SODIUM 100 MG/ML
40 INJECTION SUBCUTANEOUS EVERY 24 HOURS
Refills: 0 | Status: DISCONTINUED | OUTPATIENT
Start: 2023-08-13 | End: 2023-08-14

## 2023-08-13 RX ORDER — CEFAZOLIN SODIUM 1 G
2000 VIAL (EA) INJECTION ONCE
Refills: 0 | Status: COMPLETED | OUTPATIENT
Start: 2023-08-13 | End: 2023-08-13

## 2023-08-13 RX ORDER — HYDROXYZINE HCL 10 MG
50 TABLET ORAL AT BEDTIME
Refills: 0 | Status: DISCONTINUED | OUTPATIENT
Start: 2023-08-13 | End: 2023-08-14

## 2023-08-13 RX ADMIN — SODIUM CHLORIDE 1000 MILLILITER(S): 9 INJECTION, SOLUTION INTRAVENOUS at 15:04

## 2023-08-13 RX ADMIN — Medication 100 MILLIGRAM(S): at 15:04

## 2023-08-13 NOTE — ED ADULT NURSE NOTE - PRIMARY CARE PROVIDER
-- DO NOT REPLY / DO NOT REPLY ALL --  -- Message is from Engagement Center Operations (ECO) --    General Patient Message: [facility] called and would like order placed for One Touch Ultra Blue test strips and lancets. Pharmacy has been faxing form over but as of today it has not gone through to office.        Alternative phone number:     Can a detailed message be left? Yes    Message Turnaround:     Is it Working Hours? Yes - Working Hours     [place]:    Please give this turnaround time to the caller:   \"This message will be sent to [state Provider's name]. The clinical team will fulfill your request as soon as they review your message.\"                 PMD

## 2023-08-13 NOTE — CONSULT NOTE ADULT - SUBJECTIVE AND OBJECTIVE BOX
Podiatry Consult Note    Subjective:  DUANE RAMÍREZ  Seen Bedside 28y Female  .   Patient is a 28y old  Female who presents with a chief complaint of Pressure ulcer, Left foot   HPI:      Past Medical History and Surgical History  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      Tethered spinal cord  11/1993 SECONDARY TO CYSTIC TERATOMA      Galloway procedure  2006           Review of Systems:  [X] Ten point review of systems is otherwise negative except as noted     Objective:  Vital Signs Last 24 Hrs  T(C): 36.8 (13 Aug 2023 13:07), Max: 36.8 (13 Aug 2023 13:07)  T(F): 98.2 (13 Aug 2023 13:07), Max: 98.2 (13 Aug 2023 13:07)  HR: 103 (13 Aug 2023 13:07) (103 - 103)  BP: 114/82 (13 Aug 2023 13:07) (114/82 - 114/82)  BP(mean): --  RR: 18 (13 Aug 2023 13:07) (18 - 18)  SpO2: 95% (13 Aug 2023 13:07) (95% - 95%)    Parameters below as of 13 Aug 2023 13:07  Patient On (Oxygen Delivery Method): room air                              Physical Exam - Lower Extremity Focused:   Derm:  Scars on the dorsum of the Left foot from previous surgery fully healed incisions. Left heel full thickness pressure ulcer, Fibronecrotic base, periwound erythema and hyperkeratosis, mild serosanguinous drainage noted.  Vascular: DP and PT Pulses palpable; Foot is Warm to Warm to the touch; Capillary Refill Time < 3 Seconds;    Neuro: Protective Sensation intact   MSK: Mild Pain On Palpation at the wound site     Assessment:   - S/P Arthrodesis of comminuted LisFranc fracture, DOS; 6/23/23  - Full thickness pressure ulceration, Left heel    Plan:  Chart reviewed and Patient evaluated. All Questions and Concerns Addressed and Answered  XR Imaging Left Foot; Pending Results  Recommend; A-Duplex LLE, ESR, CRP, BMP  Please admit the pt to Medicine, obtain medical clearance  Patient is scheduled for surgical debridement Monday, 8/14/23  NPO 8/13 by MN, T&S, Coag studies, optimize lab values prior to OR     Discussed Plan w/     Podiatry  Podiatry Consult Note    Subjective:  DUANE RAMÍREZ  Seen Bedside 28y Female  .   Patient is a 28y old  Female who presents with a chief complaint of Pressure ulcer, Left foot   HPI:  S/p Lisfranc Fusion L Foot. WBAT in a CAM walker. Developed pressure ulcer from the cast.       Past Medical History and Surgical History  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      Tethered spinal cord  11/1993 SECONDARY TO CYSTIC TERATOMA      Galloway procedure  2006           Review of Systems:  [X] Ten point review of systems is otherwise negative except as noted     Objective:  Vital Signs Last 24 Hrs  T(C): 36.8 (13 Aug 2023 13:07), Max: 36.8 (13 Aug 2023 13:07)  T(F): 98.2 (13 Aug 2023 13:07), Max: 98.2 (13 Aug 2023 13:07)  HR: 103 (13 Aug 2023 13:07) (103 - 103)  BP: 114/82 (13 Aug 2023 13:07) (114/82 - 114/82)  BP(mean): --  RR: 18 (13 Aug 2023 13:07) (18 - 18)  SpO2: 95% (13 Aug 2023 13:07) (95% - 95%)    Parameters below as of 13 Aug 2023 13:07  Patient On (Oxygen Delivery Method): room air                              Physical Exam - Lower Extremity Focused:   Derm:  Scars on the dorsum of the Left foot from previous surgery fully healed incisions.   Left heel full thickness pressure ulcer, Fibronecrotic base, periwound erythema and hyperkeratosis, mild serosanguinous drainage noted.  Vascular: DP and PT Pulses palpable; Foot is Warm to Warm to the touch; Capillary Refill Time < 3 Seconds;    Neuro: Protective Sensation intact   MSK: Mild Pain On Palpation at the wound site     Assessment:   - S/P Arthrodesis of comminuted LisFranc fracture, DOS; 6/23/23  - Full thickness pressure ulceration, Left heel  - Cellulites L Foot     Plan:  Chart reviewed and Patient evaluated. All Questions and Concerns Addressed and Answered  XR Imaging Left Foot; Pending Results  Recommend; A-Duplex LLE, ESR, CRP, BMP  Please admit the pt to Medicine, obtain medical clearance  Patient is scheduled for surgical debridement Monday, 8/14/23  NPO 8/13 by MN, T&S, Coag studies, optimize lab values prior to OR     Discussed Plan w/     Podiatry

## 2023-08-13 NOTE — ED PROVIDER NOTE - ATTENDING CONTRIBUTION TO CARE
28 yr old f w/ a pmh significant for neurogenic bladder who presents for admission. Pt had arthrodesis of comminuted LisFranc fx on 6/23/23 and has been follow up with podiatry. Since procedure pt has been having ulceration on L heel that has not improved with po antibiotics. Pt denies any other medical complaints, denies numbness, tingling, swelling or any other medical complaints    VITAL SIGNS: I have reviewed nursing notes and confirm.  CONSTITUTIONAL: non-toxic, well appearing  SKIN: no rash, no petechiae.  EYES: EOMI, pink conjunctiva, anicteric  ENT: tongue midline, no exudates, MMM  NECK: Supple; no meningismus, no JVD  CARD: RRR, no murmurs, equal radial pulses bilaterally 2+  RESP: CTAB, no respiratory distress  ABD: Soft, non-tender, non-distended, no peritoneal signs, no HSM, no CVA tenderness  EXT: healed incisions on the L foot, L heel there is a pressure ulcer, no discharge. dp +2. sensation intact.     28 yr old f that presents for admission, scheduled for surgical debridement on 8/14/23/ Labs, imaging, ekg, admit. podiatry aware.

## 2023-08-13 NOTE — ED PROVIDER NOTE - OBJECTIVE STATEMENT
28-year-old female no significant past medical history complains of pain to the left foot.  Patient recently seen for a Lisfranc fracture status post fixation by Dr. Cardoso.  Dr. Beltrán sent patient in for admission for cellulitis.  Patient states is pain over the left heel no other complaints.

## 2023-08-13 NOTE — H&P ADULT - ASSESSMENT
Pods  Assessment:   - S/P Arthrodesis of comminuted LisFranc fracture, DOS; 6/23/23  - Full thickness pressure ulceration, Left heel    Plan:  Chart reviewed and Patient evaluated. All Questions and Concerns Addressed and Answered  XR Imaging Left Foot; Pending Results  Recommend; A-Duplex LLE, ESR, CRP, BMP  Please admit the pt to Medicine, obtain medical clearance  Patient is scheduled for surgical debridement Monday, 8/14/23  NPO 8/13 by MN, T&S, Coag studies, optimize lab values prior to OR         #Misc  -Routine Lab frequency:   -DVT prophylaxis:  -GI prophylaxis:  -Diet:  -Code status:  -Activity:  -Bowel regimen:  -Urinary catheter:  -Dispo:    -Med rec confirmed with patient 28 yr old f w/ a pmh significant for neurogenic bladder and anxiety who presents for left heel pressure ulcer. At baseline, AO4, completely functional.    #Left heel pressure ulcer  #Recent Left foor arthrodesis  -WBC, ESR, CRP -ve  -No fever, chills, rigors or sweats  -Surgical site clean, without erythema or discharge as per patient  -Fu Left foot x-ray, Arterial duplex  -Pending medical clearance for surgical debridement on 8/14  -NPO after midnight  -Monitor off Abx- Decide if Abx needed after surgery    #Anxiety  -c/w Hydroxyzine 50mg HS PRN  -Patient says she takes clonidine PRN for anxiety- Monitor off for now    #Misc  -DVT prophylaxis: Lovenox  -GI prophylaxis: None  -Diet: DASH  -Code status: Full  -Activity: IAT  -Dispo: Floors    -Med rec confirmed with patient 28 yr old f w/ a pmh significant for neurogenic bladder and anxiety who presents for left heel pressure ulcer. At baseline, AO4, completely functional.    #Left heel pressure ulcer  #Recent Left foor arthrodesis  -WBC, ESR, CRP -ve  -No fever, chills, rigors or sweats  -Surgical site clean, without erythema or discharge as per patient  -Fu Left foot x-ray, Arterial duplex, Blood Cx  -Pending medical clearance for surgical debridement on 8/14  -NPO after midnight  -Monitor off Abx- Decide if Abx needed after surgery    #Anxiety  -c/w Hydroxyzine 50mg HS PRN  -Patient says she takes clonidine PRN for anxiety- Monitor off for now    #Misc  -DVT prophylaxis: Lovenox  -GI prophylaxis: None  -Diet: DASH  -Code status: Full  -Activity: IAT  -Dispo: Floors    -Med rec confirmed with patient

## 2023-08-13 NOTE — ED PROVIDER NOTE - CLINICAL SUMMARY MEDICAL DECISION MAKING FREE TEXT BOX
28 yr old f that presents for admission, scheduled for surgical debridement on 8/14/23/ Labs, imaging, ekg, admit. podiatry aware. Labs and EKG were ordered and reviewed.  Imaging was ordered and reviewed by me.  Appropriate medications for patient's presenting complaints were ordered and effects were reassessed.  Patient's records (prior hospital, ED visit, and/or nursing home notes if available) were reviewed.  Additional history was obtained from EMS, family, and/or PCP (where available).  Escalation to admission/observation was considered.  Patient requires inpatient hospitalization - medicine.

## 2023-08-13 NOTE — H&P ADULT - ATTENDING COMMENTS
28 yr old f w/ a pmh significant for neurogenic bladder and anxiety who presents for left heel pressure ulcer. At baseline, AO4, completely functional.    Agree  with assessment  except for changes below.     Vital Signs (24 Hrs):  T(C): 36.9 (08-13-23 @ 15:29), Max: 36.9 (08-13-23 @ 15:29)  HR: 70 (08-13-23 @ 15:29) (70 - 103)  BP: 116/82 (08-13-23 @ 15:29) (114/82 - 116/82)  RR: 18 (08-13-23 @ 15:29) (18 - 18)  SpO2: 96% (08-13-23 @ 15:29) (95% - 96%)  Daily Height in cm: 154.94 (13 Aug 2023 13:07)          IMPRESSION  Left heel full thickness Ulcer   Recent  Arthrodesis of comminuted LisFranc fracture, DOS; 6/23/23  -WBC, ESR, CRP -ve  -No fever, chills, rigors or sweats  -Surgical site clean  -Fu Left foot x-ray, Arterial duplex, Blood Cx  -NPO after midnight  -Monitor off Abx for now     Hx Anxiety  -c/w Hydroxyzine 50mg HS PRN  -Patient says she takes clonidine PRN for anxiety- Monitor off for now 28 yr old f w/ a pmh significant for neurogenic bladder and anxiety who presents for left heel pressure ulcer. At baseline, AO4, completely functional.    Agree  with assessment  except for changes below.     Vital Signs (24 Hrs):  T(C): 36.9 (08-13-23 @ 15:29), Max: 36.9 (08-13-23 @ 15:29)  HR: 70 (08-13-23 @ 15:29) (70 - 103)  BP: 116/82 (08-13-23 @ 15:29) (114/82 - 116/82)  RR: 18 (08-13-23 @ 15:29) (18 - 18)  SpO2: 96% (08-13-23 @ 15:29) (95% - 96%)  Daily Height in cm: 154.94 (13 Aug 2023 13:07)      PHYSICAL EXAM  GENERAL: NAD, Obese   HEAD:  NCAT, EOMI, MM  NECK: Supple, Nontender  NERVOUS SYSTEM:  AAOx3, NFD  CHEST/LUNG: +bs b/l, No wheezing   HEART: +s1s2 RRR  ABDOMEN: soft, NT/ND  EXTREMITIES:  pp, no edema  SKIN: full thickness  Left heel Ulcer  no erythema or purulence , granulation tissue       IMPRESSION  Left heel full thickness Ulcer   Recent  Arthrodesis of comminuted LisFranc fracture, DOS; 6/23/23  -WBC, ESR, CRP -ve  -No fever, chills, rigors or sweats  -Surgical site clean  -Fu Left foot x-ray, Arterial duplex, Blood Cx  -NPO after midnight  -Monitor off Abx for now     Hx Anxiety  -c/w Hydroxyzine 50mg HS PRN  -Patient says she takes clonidine PRN for anxiety- Monitor off for now    Obese - Diet and Exercise counseling     Seen on 08/13/23

## 2023-08-13 NOTE — ED ADULT TRIAGE NOTE - SOURCE OF INFORMATION
Please advise on substitute.       Hypertensive Medications  Protocol Criteria:  · Appointment scheduled with Cardiology in the past 12 months or in the next 3 months  · BMP or CMP in the past 12 months  · Potassium: 3.1 - 4.9 mmol/L  · Creatinine is normal 04/18/2018    04/18/2018   CO2 29 04/18/2018   GLOBULIN 2.5 04/18/2018   ANIONGAP 7 04/18/2018   OSMOCALC 288 04/18/2018 Patient

## 2023-08-13 NOTE — ED ADULT NURSE NOTE - NSFALLRISKASMTTYPE_ED_ALL_ED
Assumed care from triage. Pt alert and interactive. Complains of abd pain to generalized abd, pt unable to locate specific location of abd. +decreased appetite x2 days. Denies nausea, emesis, diarrhea. Last BM last night. No  complaints.  Denies sob, cp,
Discharge instructions reviewed. Pt verbalized understanding with no further questions. Steady gait. Pain controlled. Speaking in full sentences.
Initial (On Arrival)

## 2023-08-13 NOTE — H&P ADULT - HISTORY OF PRESENT ILLNESS
28 yr old f w/ a pmh significant for neurogenic bladder who presents for admission. Pt had arthrodesis of comminuted LisFranc fx on 6/23/23 and has been follow up with podiatry. Since procedure pt has been having ulceration on L heel that has not improved with po antibiotics. Pt denies any other medical complaints, denies numbness, tingling, swelling or any other medical complaints       28 yr old f w/ a pmh significant for neurogenic bladder and anxiety who presents for left heel pressure ulcer. At baseline, AO4, completely functional.    Patient had arthrodesis of comminuted LisFranc fx on 6/23/23, had a splint for 1 week after which she developed left heel pressure ulcer. The ulcer is not improving but is also not worsening. Patient tried 5 days of bactrim (today day 5) without any change in appearance of ulcer. As per patient no purulent drainage or pain at rest. Patient endorse some tenderness to palpation and mild surrounding erythema which is not worsening. Denies any numbness, tingling, swelling. Denies HA, dizziness, NVD, fever, SOB, chest pain, abdominal pain, change in urination and change in bowel habits.      In the ED, s/p Ancef, WBC, ESR, CRP -ve. No fever. Podiatry planning for debridement for tomorrow.    Vital Signs Last 24 Hrs:  T(F): 98.4 (13 Aug 2023 15:29), Max: 98.4 (13 Aug 2023 15:29)  HR: 70 (13 Aug 2023 15:29) (70 - 103)  BP: 116/82 (13 Aug 2023 15:29) (114/82 - 116/82)  RR: 18 (13 Aug 2023 15:29) (18 - 18)  SpO2: 96% (13 Aug 2023 15:29) (95% - 96%) on RA

## 2023-08-13 NOTE — H&P ADULT - NSHPPHYSICALEXAM_GEN_ALL_CORE
GENERAL: NAD, lying in bed comfortably  CHEST/LUNG:  No rales, rhonchi, wheezing, or rubs. Unlabored respirations  HEART: No murmurs, rubs, or gallops  ABDOMEN:  Soft, Nontender, Nondistended  EXTREMITIES:   No clubbing, cyanosis, or edema  NERVOUS SYSTEM:  Alert & Oriented X3, speech clear. No deficits   EXT: healed incisions on the L foot, L heel there is a pressure ulcer, no discharge. dp +2. sensation intact.

## 2023-08-13 NOTE — ED PROVIDER NOTE - PHYSICAL EXAMINATION
CONSTITUTIONAL: nontoxic appearing, in no acute distress  HEAD:  normocephalic, atraumatic  EYES:  no conjunctival injection, no eye discharge, tracking well  ENT:  mmm  NECK:  supple, no masses, no tender anterior/posterior cervical lymphadenopathy  CV:  regular rate and rhythm, cap refill < 2 seconds  RESP:  normal respiratory effort, lungs clear to auscultation bilaterally, no wheezes, no crackles, no retractions, no stridor  ABD:  soft, nontender, nondistended, no masses, no organomegaly  LYMPH:  no significant lymphadenopathy  MSK/NEURO:  normal movement, normal tone; L foot w/ surgical scar, ecchymotic, edema, erythema; erythema of L heel; ttp; R foot nl; dp 2+ b/l   SKIN:  warm, dry, no rash

## 2023-08-13 NOTE — ED ADULT TRIAGE NOTE - CHIEF COMPLAINT QUOTE
C/o left heel ischemic wound with possible left heel cellulitis sent in by Dr Ferrari for blood work

## 2023-08-14 ENCOUNTER — RESULT REVIEW (OUTPATIENT)
Age: 28
End: 2023-08-14

## 2023-08-14 ENCOUNTER — TRANSCRIPTION ENCOUNTER (OUTPATIENT)
Age: 28
End: 2023-08-14

## 2023-08-14 PROCEDURE — 88304 TISSUE EXAM BY PATHOLOGIST: CPT | Mod: 26

## 2023-08-14 PROCEDURE — 11043 DBRDMT MUSC&/FSCA 1ST 20/<: CPT | Mod: 79,GC

## 2023-08-14 PROCEDURE — 99239 HOSP IP/OBS DSCHRG MGMT >30: CPT | Mod: GC

## 2023-08-14 RX ORDER — HYDROMORPHONE HYDROCHLORIDE 2 MG/ML
0.5 INJECTION INTRAMUSCULAR; INTRAVENOUS; SUBCUTANEOUS
Refills: 0 | Status: DISCONTINUED | OUTPATIENT
Start: 2023-08-14 | End: 2023-08-14

## 2023-08-14 RX ORDER — ENOXAPARIN SODIUM 100 MG/ML
40 INJECTION SUBCUTANEOUS EVERY 24 HOURS
Refills: 0 | Status: DISCONTINUED | OUTPATIENT
Start: 2023-08-14 | End: 2023-08-17

## 2023-08-14 RX ORDER — HYDROMORPHONE HYDROCHLORIDE 2 MG/ML
1 INJECTION INTRAMUSCULAR; INTRAVENOUS; SUBCUTANEOUS
Refills: 0 | Status: DISCONTINUED | OUTPATIENT
Start: 2023-08-14 | End: 2023-08-14

## 2023-08-14 RX ORDER — ACETAMINOPHEN 500 MG
2 TABLET ORAL
Qty: 0 | Refills: 0 | DISCHARGE
Start: 2023-08-14

## 2023-08-14 RX ORDER — ACETAMINOPHEN 500 MG
2 TABLET ORAL
Qty: 96 | Refills: 0
Start: 2023-08-14 | End: 2023-08-27

## 2023-08-14 RX ORDER — ACETAMINOPHEN 500 MG
650 TABLET ORAL ONCE
Refills: 0 | Status: COMPLETED | OUTPATIENT
Start: 2023-08-14 | End: 2023-08-14

## 2023-08-14 RX ORDER — SODIUM CHLORIDE 9 MG/ML
1000 INJECTION, SOLUTION INTRAVENOUS
Refills: 0 | Status: DISCONTINUED | OUTPATIENT
Start: 2023-08-14 | End: 2023-08-14

## 2023-08-14 RX ORDER — COLLAGENASE CLOSTRIDIUM HIST. 250 UNIT/G
1 OINTMENT (GRAM) TOPICAL DAILY
Refills: 0 | Status: DISCONTINUED | OUTPATIENT
Start: 2023-08-14 | End: 2023-08-17

## 2023-08-14 RX ORDER — ACETAMINOPHEN 500 MG
1000 TABLET ORAL ONCE
Refills: 0 | Status: DISCONTINUED | OUTPATIENT
Start: 2023-08-14 | End: 2023-08-14

## 2023-08-14 RX ORDER — HYDROXYZINE HCL 10 MG
50 TABLET ORAL AT BEDTIME
Refills: 0 | Status: DISCONTINUED | OUTPATIENT
Start: 2023-08-14 | End: 2023-08-17

## 2023-08-14 RX ADMIN — ENOXAPARIN SODIUM 40 MILLIGRAM(S): 100 INJECTION SUBCUTANEOUS at 17:26

## 2023-08-14 RX ADMIN — Medication 50 MILLIGRAM(S): at 22:01

## 2023-08-14 RX ADMIN — Medication 650 MILLIGRAM(S): at 23:03

## 2023-08-14 NOTE — DISCHARGE NOTE PROVIDER - HOSPITAL COURSE
28 yr old woman kth neurogenic bladder and anxiety presents for left heel pressure ulcer. At baseline, AO4, completely functional.    Patient had arthrodesis of comminuted LisFranc fx on 6/23/23, had a splint for 1 week after which she developed left heel pressure ulcer. The ulcer is neither improving nor worsening. Patient tried 5 days of bactrim PTP without any change in appearance of ulcer. As per patient no purulent drainage or pain at rest. Patient endorse some tenderness to palpation and mild surrounding erythema. Denies any numbness, tingling, swelling. Denies HA, dizziness, NVD, fever, SOB, chest pain, abdominal pain, change in urination and change in bowel habits.      In the ED, s/p Ancef, WBC, ESR, CRP -ve. No fever. Podiatry saw her and did debridement on 8/14/23.  Patient can be discharged home off antibiotics.   28 yr old woman with history of neurogenic bladder and anxiety presents for left heel pressure ulcer. Patient had arthrodesis of comminuted LisFranc fx on 6/23/23, had a splint for 1 week after which she developed left heel pressure ulcer. The ulcer is not improving but is also not worsening. Patient tried 5 days of bactrim (today day 5) without any change in appearance of ulcer. As per patient no purulent drainage or pain at rest. Patient endorse some tenderness to palpation and mild surrounding erythema which is not worsening. Denies any numbness, tingling, swelling. Denies HA, dizziness, NVD, fever, SOB, chest pain, abdominal pain, change in urination and change in bowel habits.      ED course: vitals stable. WBC, ESR and CRP negative. Physical exam showed healed incisions on the L foot, L heel pressure ulcer, no discharge. Dorsalis pedis pulse present. Sensation intact.   Given dose of Ancef. L ankle xray showed: Abnormality seen in the medial aspect of the base of the second metatarsal which is most indicative of Lisfranc's injury, chronic. The   plantar arch is intact. Ankle mortise is normal. Soft tissue swelling seen. No evidence of OM.     Pt evaluated by podiatry who recommended debridement. After debridement, reevaluated by Burn for skin grafting. During 2nd operation, selective debridement was performed but skin graft was not placed as the tissue was not vascularized enough yet at that time. Wound vac was placed. Local wound care given. Pt remained hemodynamically stable, without signs of sepsis, and pain was controlled. Medicine attending -    Patient seen and examined this morning  sitting in bed and eating breakfast  no complaints  wound vac in place  home wound vac delivered and home care arranged  plan is to dc home today    Vital Signs Last 24 Hrs  T(C): 36.8 (20 Aug 2023 08:39), Max: 36.8 (20 Aug 2023 08:39)  T(F): 98.3 (20 Aug 2023 08:39), Max: 98.3 (20 Aug 2023 08:39)  HR: 98 (20 Aug 2023 08:39) (81 - 98)  BP: 135/87 (20 Aug 2023 08:39) (126/79 - 144/79)  BP(mean): --  RR: 18 (20 Aug 2023 08:39) (18 - 18)  SpO2: 99% (20 Aug 2023 08:39) (97% - 99%)    Parameters below as of 20 Aug 2023 08:39  Patient On (Oxygen Delivery Method): room air    28 yr old woman with history of neurogenic bladder and anxiety presents for left heel pressure ulcer. Patient had arthrodesis of comminuted LisFranc fx on 6/23/23, had a splint for 1 week after which she developed left heel pressure ulcer. The ulcer is not improving but is also not worsening. Patient tried 5 days of bactrim (today day 5) without any change in appearance of ulcer. As per patient no purulent drainage or pain at rest. Patient endorse some tenderness to palpation and mild surrounding erythema which is not worsening. Denies any numbness, tingling, swelling. Denies HA, dizziness, NVD, fever, SOB, chest pain, abdominal pain, change in urination and change in bowel habits.      ED course: vitals stable. WBC, ESR and CRP negative. Physical exam showed healed incisions on the L foot, L heel pressure ulcer, no discharge. Dorsalis pedis pulse present. Sensation intact.   Given dose of Ancef. L ankle xray showed: Abnormality seen in the medial aspect of the base of the second metatarsal which is most indicative of Lisfranc's injury, chronic. The   plantar arch is intact. Ankle mortise is normal. Soft tissue swelling seen. No evidence of OM.     Pt evaluated by podiatry who recommended debridement. After debridement, reevaluated by Burn for skin grafting. During 2nd operation, selective debridement was performed but skin graft was not placed as the tissue was not vascularized enough yet at that time. Wound vac was placed. Local wound care given. Pt remained hemodynamically stable, without signs of sepsis, and pain was controlled.     dc home with wound vac   D/C today; d/c planning took over 75 min  d/c papers done by me  discussed d/c plan and all instructions in detail

## 2023-08-14 NOTE — DISCHARGE NOTE PROVIDER - NSDCCPCAREPLAN_GEN_ALL_CORE_FT
PRINCIPAL DISCHARGE DIAGNOSIS  Diagnosis: Pressure ulcer  Assessment and Plan of Treatment: You presented for a non healing left heel ulcer. Inflammatory markers were negative and exam didnt show signs of infection. Podiatry were consulted and they did a debridement. Please follow up with yout podiatry doctor as outpatient. If fever/chills, increased pain, erythema or purulent secretions, please seek medical attention.     PRINCIPAL DISCHARGE DIAGNOSIS  Diagnosis: Pressure ulcer  Assessment and Plan of Treatment: You presented for a non healing left heel ulcer. Inflammatory markers were negative and exam didnt show signs of infection. Podiatry were consulted and they did a debridement. You were also seen by the burn team do place a skin graft, however during the operation they saw the tissue was ready to accept a skin graft yet at that time because the tissue was not vascularized enough yet. You were also given antibiotics. Please follow up with your PCP in 1 week as well as with podiatry and burn team for possible skin grafting in future. If you experience fever/chills, increased pain, redness or discharge, please seek medical attention.

## 2023-08-14 NOTE — DISCHARGE NOTE NURSING/CASE MANAGEMENT/SOCIAL WORK - NSDCPEFALRISK_GEN_ALL_CORE
For information on Fall & Injury Prevention, visit: [email or web address] OR  [email or web address] OR  https://www.cdc.gov/steadi/patient.html

## 2023-08-14 NOTE — PATIENT PROFILE ADULT - FALL HARM RISK - HARM RISK INTERVENTIONS
Assistance with ambulation/Assistance OOB with selected safe patient handling equipment/Communicate Risk of Fall with Harm to all staff/Discuss with provider need for PT consult/Monitor gait and stability/Provide patient with walking aids - walker, cane, crutches/Reinforce activity limits and safety measures with patient and family/Sit up slowly, dangle for a short time, stand at bedside before walking/Tailored Fall Risk Interventions/Use of alarms - bed, chair and/or voice tab/Visual Cue: Yellow wristband and red socks/Bed in lowest position, wheels locked, appropriate side rails in place/Call bell, personal items and telephone in reach/Instruct patient to call for assistance before getting out of bed or chair/Non-slip footwear when patient is out of bed/[place] to call system/Physically safe environment - no spills, clutter or unnecessary equipment/Purposeful Proactive Rounding/Room/bathroom lighting operational, light cord in reach

## 2023-08-14 NOTE — CHART NOTE - NSCHARTNOTEFT_GEN_A_CORE
PACU ANESTHESIA ADMISSION NOTE      Procedure: Debridement, sharp, muscle, foot      Post op diagnosis:  Pressure ulcer, heel, left, unstageable        ____  Intubated  TV:______       Rate: ______      FiO2: ______    __x__  Patent Airway    __x__  Full return of protective reflexes    __x__  Full recovery from anesthesia / back to baseline status    Vitals:  T(C): 36.7 (08-14-23 @ 15:45), Max: 36.7 (08-14-23 @ 12:29)  HR: 58 (08-14-23 @ 15:45) (57 - 90)  BP: 126/68 (08-14-23 @ 15:45) (108/57 - 149/91)  RR: 20 (08-14-23 @ 15:45) (14 - 21)  SpO2: 96% (08-14-23 @ 15:45) (96% - 99%)    Mental Status:  __x__ Awake   ___x__ Alert   _____ Drowsy   _____ Sedated    Nausea/Vomiting:  __x__ NO  ______Yes,   See Post - Op Orders          Pain Scale (0-10):  _____    Treatment: ____ None    __x__ See Post - Op/PCA Orders    Post - Operative Fluids:   ____ Oral   __x__ See Post - Op Orders    Plan: Discharge:   ____Home       _x____Floor     _____Critical Care    _____  Other:_________________    Comments: Patient had smooth intraoperative event, no anesthesia complication.  PACU Vital signs: HR:            BP:        /          RR:             O2 Sat:       %     Temp PACU ANESTHESIA ADMISSION NOTE      Procedure: Debridement, sharp, muscle, foot      Post op diagnosis:  Pressure ulcer, heel, left, unstageable        ____  Intubated  TV:______       Rate: ______      FiO2: ______    __x__  Patent Airway    __x__  Full return of protective reflexes    __x__  Full recovery from anesthesia / back to baseline status    Vitals:  T(C): 36.7 (08-14-23 @ 15:45), Max: 36.7 (08-14-23 @ 12:29)  HR: 58 (08-14-23 @ 15:45) (57 - 90)  BP: 126/68 (08-14-23 @ 15:45) (108/57 - 149/91)  RR: 20 (08-14-23 @ 15:45) (14 - 21)  SpO2: 96% (08-14-23 @ 15:45) (96% - 99%)    Mental Status:  __x__ Awake   ___x__ Alert   _____ Drowsy   _____ Sedated    Nausea/Vomiting:  __x__ NO  ______Yes,   See Post - Op Orders          Pain Scale (0-10):  _____    Treatment: ____ None    __x__ See Post - Op/PCA Orders    Post - Operative Fluids:   ____ Oral   __x__ See Post - Op Orders    Plan: Discharge:   ____Home       _x____Floor     _____Critical Care    _____  Other:_________________    Comments: Patient had smooth intraoperative event, no anesthesia complication.  PACU Vital signs: HR:   87         BP:        149/91          RR:       20      O2 Sat:       97%     Temp 97.5

## 2023-08-14 NOTE — DISCHARGE NOTE NURSING/CASE MANAGEMENT/SOCIAL WORK - PATIENT PORTAL LINK FT
You can access the FollowMyHealth Patient Portal offered by [facility] by registering at the following website: [email or web address]. By joining [facility]’s FollowMyHealth portal, you will also be able to view your health information using other applications (apps) compatible with our system.

## 2023-08-14 NOTE — BRIEF OPERATIVE NOTE - DISPOSITION
Subjective       History of Present Illness    Chief Complaint   Patient presents with   • Gynecologic Exam       Alysha Candelaria is a 33 y.o. female who presents for annual exam.  She stopped the pill a few months ago after her  got a vasectomy.  Periods are regular and not too heavy.  Kids (5,6) are doing well.  No bowel or bladder problems.  No dyspareunia.    OB History    Para Term  AB Living   3 2   1    SAB TAB Ectopic Multiple Live Births   1          # Outcome Date GA Lbr Bernardo/2nd Weight Sex Delivery Anes PTL Lv   3 SAB            2 Para            1 Para                   The following portions of the patient's history were reviewed and updated as appropriate: allergies, current medications, past family history, past medical history, past social history, past surgical history and problem list.    Her menses are regular every 28-30 days, lasting 4-7 days.    Current contraception: vasectomy  History of abnormal Pap smear: no  Received Gardasil immunization: no  Perform regular self breast exam: no  Family history of uterine or ovarian cancer: no  Family History of colon cancer: no  Family history of breast cancer: no    Mammogram: not indicated.  Colonoscopy: not indicated.  DEXA: not indicated.  Last Pap:2016 neg/neg    Smoking status: Never Smoker                                                                 Smokeless status: Not on file                       Exercise: not active  Calcium/Vitamin D: adequate intake    The following portions of the patient's history were reviewed and updated as appropriate: allergies, current medications, past family history, past medical history, past social history, past surgical history and problem list.    Review of Systems   Constitutional: Negative.    HENT: Negative.    Eyes: Negative.    Respiratory: Negative.    Cardiovascular: Negative.    Gastrointestinal: Negative.    Endocrine: Negative.    Genitourinary: Negative.    Musculoskeletal:  "Negative.    Skin: Negative.    Allergic/Immunologic: Negative.    Neurological: Negative.    Hematological: Negative.    Psychiatric/Behavioral: Negative.          Objective   Physical Exam   Constitutional: She is oriented to person, place, and time. She appears well-developed and well-nourished.   Neck: No thyroid mass present.   Cardiovascular: Normal rate, regular rhythm and normal heart sounds.    Pulmonary/Chest: Effort normal and breath sounds normal. Right breast exhibits no mass, no nipple discharge, no skin change and no tenderness. Left breast exhibits no mass, no nipple discharge, no skin change and no tenderness.   Abdominal: Soft. There is no tenderness.   Genitourinary: Vagina normal and uterus normal. There is no rash or lesion on the right labia. There is no rash or lesion on the left labia. Cervix exhibits no motion tenderness, no discharge and no friability. Right adnexum displays no mass and no tenderness. Left adnexum displays no mass and no tenderness.   Neurological: She is alert and oriented to person, place, and time.   Psychiatric: She has a normal mood and affect. Her behavior is normal.   Vitals reviewed.      /70  Ht 62\" (157.5 cm)  Wt 186 lb (84.4 kg)  LMP 11/10/2017  BMI 34.02 kg/m2    Assessment/Plan   Alysha was seen today for gynecologic exam.    Diagnoses and all orders for this visit:    Encounter for gynecological examination        Breast self exam technique reviewed and patient encouraged to perform self-exam monthly.  Discussed healthy lifestyle modifications.  Pap smear up to date  Recommended 30 minutes of aerobic exercise five times per week.  Discussed calcium needs to prevent osteoporosis  Discussed baseline mammogram if patient wishes, she prefers to wait until 38.         " PACU then Floor

## 2023-08-14 NOTE — DISCHARGE NOTE PROVIDER - PROVIDER TOKENS
PROVIDER:[TOKEN:[[id number]],FOLLOWUP:[1 week]] PROVIDER:[TOKEN:[[id number]],FOLLOWUP:[1 week]],PROVIDER:[TOKEN:[[id number]]],PROVIDER:[TOKEN:[[id number]]] PROVIDER:[TOKEN:[[id number]],FOLLOWUP:[1 week]],PROVIDER:[TOKEN:[[id number]],FOLLOWUP:[1 week]],PROVIDER:[TOKEN:[[id number]],FOLLOWUP:[1 week]]

## 2023-08-14 NOTE — CHART NOTE - NSCHARTNOTEFT_GEN_A_CORE
Patient is scheduled for surgery today 8/14  Please make sure adequate medical clearance is completed and patient remains NPO, thank you

## 2023-08-14 NOTE — PROGRESS NOTE ADULT - SUBJECTIVE AND OBJECTIVE BOX
DUANE RAMÍREZ  28y  Female      Patient is a 28y old  Female who presents with a chief complaint of     INTERVAL HPI/OVERNIGHT EVENTS:  She feels ok, no fever, no new complaints.   Vital Signs Last 24 Hrs  T(C): 36.7 (14 Aug 2023 12:29), Max: 36.9 (13 Aug 2023 15:29)  T(F): 98 (14 Aug 2023 12:29), Max: 98.4 (13 Aug 2023 15:29)  HR: 58 (14 Aug 2023 12:29) (58 - 70)  BP: 108/57 (14 Aug 2023 12:29) (108/57 - 120/72)  BP(mean): --  RR: 18 (14 Aug 2023 12:29) (18 - 18)  SpO2: 97% (14 Aug 2023 12:29) (96% - 99%)    Parameters below as of 14 Aug 2023 08:54  Patient On (Oxygen Delivery Method): room air                Consultant(s) Notes Reviewed:  [x ] YES  [ ] NO          MEDICATIONS  (STANDING):    MEDICATIONS  (PRN):      LABS                          13.4   9.55  )-----------( 275      ( 13 Aug 2023 15:46 )             42.1     08-13    136  |  103  |  12  ----------------------------<  92  4.7   |  20  |  1.0    Ca    9.9      13 Aug 2023 15:46  Mg     1.9     08-13    TPro  7.5  /  Alb  4.8  /  TBili  0.7  /  DBili  x   /  AST  16  /  ALT  15  /  AlkPhos  74  08-13      Urinalysis Basic - ( 13 Aug 2023 15:46 )    Color: x / Appearance: x / SG: x / pH: x  Gluc: 92 mg/dL / Ketone: x  / Bili: x / Urobili: x   Blood: x / Protein: x / Nitrite: x   Leuk Esterase: x / RBC: x / WBC x   Sq Epi: x / Non Sq Epi: x / Bacteria: x      PT/INR - ( 13 Aug 2023 21:01 )   PT: 11.80 sec;   INR: 1.03 ratio         PTT - ( 13 Aug 2023 21:01 )  PTT:33.2 sec  Lactate Trend  08-13 @ 15:46 Lactate:1.6         CAPILLARY BLOOD GLUCOSE            RADIOLOGY & ADDITIONAL TESTS:    Imaging Personally Reviewed:  [ ] YES  [ ] NO    HEALTH ISSUES - PROBLEM Dx:          PHYSICAL EXAM:  GENERAL: NAD, well-developed.  HEAD:  Atraumatic, Normocephalic.  EYES: EOMI, PERRLA, conjunctiva and sclera clear.  NECK: Supple, No JVD.  CHEST/LUNG: Clear to auscultation bilaterally; No wheeze.  HEART: Regular rate and rhythm; S1 S2.   ABDOMEN: Soft, Nontender, Nondistended; Bowel sounds present.  EXTREMITIES:  2+ Peripheral Pulses, left heel ulcer.   PSYCH: AAOx3.  NEUROLOGY: non-focal.  SKIN: left heel ulcer with clean base.

## 2023-08-14 NOTE — DISCHARGE NOTE PROVIDER - NSDCHHHOMEBOUNDOTHER_GEN_ALL_CORE_FT
Pressure ulcer on left heel Pressure ulcer on left heel  wound care and wound vac instructions as per burn team

## 2023-08-14 NOTE — DISCHARGE NOTE PROVIDER - NSDCMRMEDTOKEN_GEN_ALL_CORE_FT
acetaminophen 500 mg oral tablet: 2 tab(s) orally once As needed Mild Pain (1 - 3)  cloNIDine 0.1 mg oral tablet: 1 orally once a day prn htn  hydrOXYzine hydrochloride 50 mg oral tablet: 1 tab(s) orally once a day as needed for  anxiety  vitamins multi vitamin, vitamin d3 plus k2, flow: one day   acetaminophen 500 mg oral tablet: 2 tab(s) orally every 6 to 8 hours as needed for  moderate pain  cloNIDine 0.1 mg oral tablet: 1 orally once a day prn htn  hydrOXYzine hydrochloride 50 mg oral tablet: 1 tab(s) orally once a day as needed for  anxiety  vitamins multi vitamin, vitamin d3 plus k2, flow: one day   acetaminophen 500 mg oral tablet: 2 tab(s) orally every 6 to 8 hours as needed for  moderate pain  doxycycline hyclate 100 mg oral tablet: 1 tab(s) orally 2 times a day  hydrOXYzine hydrochloride 50 mg oral tablet: 1 tab(s) orally once a day as needed for  anxiety  levoFLOXacin 500 mg oral tablet: 1 tab(s) orally once a day  levoFLOXacin 500 mg oral tablet: 1 tab(s) orally once a day  oxycodone-acetaminophen 5 mg-325 mg oral tablet: 1 tab(s) orally once a day as needed for  severe pain with dressing changes MDD: 1 tab  vitamins multi vitamin, vitamin d3 plus k2, flow: one day

## 2023-08-14 NOTE — ED ADULT NURSE REASSESSMENT NOTE - NS ED NURSE REASSESS COMMENT FT1
Pt. received from previous RN. Pt. lying on stretcher, breathing with ease on RA. A&O x4. 20g noted to the R AC; IV intact, no redness/swelling at site. Awaiting clean bed assignment.

## 2023-08-14 NOTE — PROGRESS NOTE ADULT - ASSESSMENT
28 yr old f w/ a pmh significant for neurogenic bladder and anxiety who presents for left heel pressure ulcer. At baseline, AO4, completely functional.    Left heel pressure ulcer  Recent Left foot arthrodesis  Ulcer with clean base, no signs of infections.   ESR and CRP normal.   Foot X ray showed no signs of OM.   Venous duplex and arterial duplex normal.   Podiatry plan for wound debridement.   No need for antibiotics.       DVT prophylaxis: Lovenox

## 2023-08-14 NOTE — DISCHARGE NOTE PROVIDER - CARE PROVIDERS DIRECT ADDRESSES
,DirectAddress_Unknown ,DirectAddress_Unknown,[email or web address],DirectAddress_Unknown

## 2023-08-14 NOTE — DISCHARGE NOTE PROVIDER - CARE PROVIDER_API CALL
Maria M Ferrari  Podiatric Medicine and Surgery  [address] [place] [address]  Phone: [phone]  Fax: [phone]  Follow Up Time: 1 week   Maria M Ferrari  Podiatric Medicine and Surgery  [address] [place] [address]  Phone: [phone]  Fax: [phone]  Follow Up Time: 1 week    Everardo Mcghee  Plastic Surgery  [address] [place] [address]  Phone: [phone]  Fax: [phone]  Follow Up Time:     Christiano Richter  Internal Medicine  [address] [place] [address]  Phone: [phone]  Fax: [phone]  Follow Up Time:    Maria M Ferrari  Podiatric Medicine and Surgery  [address] [place] [address]  Phone: [phone]  Fax: [phone]  Follow Up Time: 1 week    Everardo Mcghee  Plastic Surgery  [address] [place] [address]  Phone: [phone]  Fax: [phone]  Follow Up Time: 1 week    Christiano Richter  Internal Medicine  [address] [place] [address]  Phone: [phone]  Fax: [phone]  Follow Up Time: 1 week

## 2023-08-15 PROCEDURE — 99232 SBSQ HOSP IP/OBS MODERATE 35: CPT

## 2023-08-15 PROCEDURE — 99221 1ST HOSP IP/OBS SF/LOW 40: CPT

## 2023-08-15 PROCEDURE — 99231 SBSQ HOSP IP/OBS SF/LOW 25: CPT | Mod: GC,24

## 2023-08-15 RX ORDER — OXYCODONE HYDROCHLORIDE 5 MG/1
5 TABLET ORAL ONCE
Refills: 0 | Status: DISCONTINUED | OUTPATIENT
Start: 2023-08-15 | End: 2023-08-15

## 2023-08-15 RX ADMIN — ENOXAPARIN SODIUM 40 MILLIGRAM(S): 100 INJECTION SUBCUTANEOUS at 17:38

## 2023-08-15 RX ADMIN — Medication 50 MILLIGRAM(S): at 21:18

## 2023-08-15 RX ADMIN — Medication 650 MILLIGRAM(S): at 00:00

## 2023-08-15 RX ADMIN — Medication 1 APPLICATION(S): at 11:49

## 2023-08-15 NOTE — PROGRESS NOTE ADULT - SUBJECTIVE AND OBJECTIVE BOX
Patient is a 28y old  Female who presents with a chief complaint of Left heel ulcer (14 Aug 2023 14:52)      INTERVAL HPI/OVERNIGHT EVENTS:  Pt was examined at bedside where she was laying comfortably in bed awake. No overnight events. No pt complaints, pain is well controlled. Pt's only concern being to be seen by burn for possible skin graft on LLE s/p debridement.     REVIEW OF SYSTEMS:  CONSTITUTIONAL: No fever or fatigue  RESPIRATORY: No cough, wheezing; No shortness of breath  CARDIOVASCULAR: No chest pain, palpitations  GASTROINTESTINAL: No abdominal or epigastric pain. No nausea, vomitings; No diarrhea or constipation.  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      Tethered spinal cord  11/1993 SECONDARY TO CYSTIC TERATOMA      Galloway procedure  2006          FAMILY HISTORY:  FH: heart disease (Father)        VITALS:  T(C): 36.6 (08-15-23 @ 08:38), Max: 36.7 (08-14-23 @ 12:29)  HR: 78 (08-15-23 @ 08:38) (57 - 90)  BP: 119/78 (08-15-23 @ 08:38) (108/57 - 149/91)  RR: 18 (08-15-23 @ 08:38) (14 - 21)  SpO2: 97% (08-15-23 @ 08:38) (96% - 98%)  Wt(kg): --Vital Signs Last 24 Hrs  T(C): 36.6 (15 Aug 2023 08:38), Max: 36.7 (14 Aug 2023 12:29)  T(F): 97.8 (15 Aug 2023 08:38), Max: 98.1 (14 Aug 2023 15:45)  HR: 78 (15 Aug 2023 08:38) (57 - 90)  BP: 119/78 (15 Aug 2023 08:38) (108/57 - 149/91)  BP(mean): --  RR: 18 (15 Aug 2023 08:38) (14 - 21)  SpO2: 97% (15 Aug 2023 08:38) (96% - 98%)    Parameters below as of 15 Aug 2023 08:38  Patient On (Oxygen Delivery Method): room air        PHYSICAL EXAM:  GENERAL: NAD  HEAD:  Atraumatic, Normocephalic  NECK: Supple, No JVD  NERVOUS SYSTEM:  Alert & Oriented X3  CHEST/LUNG: Clear to percussion bilaterally; No rales, rhonchi, wheezing, or rubs  HEART: Regular rate and rhythm; No murmurs, rubs, or gallops  ABDOMEN: Soft, Nontender, Nondistended  EXTREMITIES:  2+ Peripheral Pulses. Wound dressing on LLE clean, dry, and intact.       Consultant(s) Notes Reviewed:  [x ] YES  [ ] NO  Care Discussed with Consultants/Other Providers [ x] YES  [ ] NO    LABS:                        13.4   9.55  )-----------( 275      ( 13 Aug 2023 15:46 )             42.1     08-13    136  |  103  |  12  ----------------------------<  92  4.7   |  20  |  1.0    Ca    9.9      13 Aug 2023 15:46  Mg     1.9     08-13    TPro  7.5  /  Alb  4.8  /  TBili  0.7  /  DBili  x   /  AST  16  /  ALT  15  /  AlkPhos  74  08-13      RADIOLOGY & ADDITIONAL TESTS:  < from: [facility] Duplex Lower Ext Vein Scan, Left (08.13.23 @ 18:25) >  Impression:    No evidence of deep venous thrombosis or superficial thrombophlebitis in   the left lower extremity.    ICD-10:M79.89    --- End of Report ---    < end of copied text >      < from: [facility] Duplex Low Ext Arterial, Ltd, Left (08.13.23 @ 18:23) >  Impression:    Normal arterial flow in the left lower extremity.    ICD-10:I70.212    --- End of Report ---    < end of copied text >      < from: Xray Foot AP + Lateral + Oblique, Left (08.13.23 @ 16:12) >  IMPRESSION:  1.  Status post Lisfranc joint complex ORIF without evidence of   complication.    2.  Skin irregularity at the posterior aspect of the heel and bandage.   Ulcer is not excluded. No definite subjacent osteomyelitis is seen.    --- End of Report ---    < end of copied text >      Imaging Personally Reviewed:  [ ] YES  [ ] NO    MEDICATIONS:  collagenase Ointment 1 Application(s) Topical daily  enoxaparin Injectable 40 milliGRAM(s) SubCutaneous every 24 hours  hydrOXYzine hydrochloride 50 milliGRAM(s) Oral at bedtime PRN  oxycodone    5 mG/acetaminophen 325 mG 1 Tablet(s) Oral daily

## 2023-08-15 NOTE — CONSULT NOTE ADULT - SUBJECTIVE AND OBJECTIVE BOX
HPI:  28 yr old f w/ a pmh significant for neurogenic bladder and anxiety who presents for left heel pressure ulcer. At baseline, AO4, completely functional.    Patient had arthrodesis of comminuted LisFranc fx on 6/23/23, had a splint for 1 week after which she developed left heel pressure ulcer. The ulcer is not improving but is also not worsening. Patient tried 5 days of bactrim (today day 5) without any change in appearance of ulcer. As per patient no purulent drainage or pain at rest. Patient endorse some tenderness to palpation and mild surrounding erythema which is not worsening. Denies any numbness, tingling, swelling. Denies HA, dizziness, NVD, fever, SOB, chest pain, abdominal pain, change in urination and change in bowel habits.      In the ED, s/p Ancef, WBC, ESR, CRP -ve. No fever. Podiatry planning for debridement for tomorrow.    Vital Signs Last 24 Hrs:  T(F): 98.4 (13 Aug 2023 15:29), Max: 98.4 (13 Aug 2023 15:29)  HR: 70 (13 Aug 2023 15:29) (70 - 103)  BP: 116/82 (13 Aug 2023 15:29) (114/82 - 116/82)  RR: 18 (13 Aug 2023 15:29) (18 - 18)  SpO2: 96% (13 Aug 2023 15:29) (95% - 96%) on RA (13 Aug 2023 17:52)    BURN consulted for left heel wound     Allergies    No Known Allergies    Intolerances      PAST MEDICAL & SURGICAL HISTORY:  Neurogenic bowel  s/p galloway procedure, gives self enema every other day via umbilicus      Neurogenic bladder  self straight cath 4 times a day      Pilonidal cyst      Hypertension      Hypertension      Anxiety and depression      Lisfranc dislocation      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      Tethered spinal cord  11/1993 SECONDARY TO CYSTIC TERATOMA      Galloway procedure  2006      ICU Vital Signs Last 24 Hrs  T(C): 36.8 (15 Aug 2023 15:30), Max: 36.8 (15 Aug 2023 15:30)  T(F): 98.2 (15 Aug 2023 15:30), Max: 98.2 (15 Aug 2023 15:30)  HR: 70 (15 Aug 2023 15:30) (70 - 80)  BP: 134/77 (15 Aug 2023 15:30) (118/77 - 134/77)  RR: 15 (15 Aug 2023 15:30) (15 - 18)  SpO2: 97% (15 Aug 2023 08:38) (97% - 97%)    O2 Parameters below as of 15 Aug 2023 08:38  Patient On (Oxygen Delivery Method): room air      PHYSICAL EXAM:  GENERAL: NAD, well-developed  HEAD:  Atraumatic, Normocephalic  CHEST/LUNG: Breathing comfortably on room air. No increased work of breathing noted.   HEART: In no acute cardiopulmonary distress   PSYCH: AAOx3  SKIN: Left heel: Full thickness wound ~2cm x2cm : pink and moist base. no erythema or edema noted. No purulent drainage, malodor or active bleeding evident.     Dressing change performed. Patient tolerated well.

## 2023-08-15 NOTE — CONSULT NOTE ADULT - ASSESSMENT
ASSESSMENT:  Full thickness Left heel wound    RECOMMENDATION:  Wound care -Please wash wound with soap and water, apply santyl cover with adaptic, wrap with Kerlix and ACE twice a day.   Plan for Surgical debridement and possible skin graft this week.   Offloading/ positional changes    Remainder of care per primary team.     Plan of care discussed with patient. Concerns addressed.

## 2023-08-15 NOTE — PROGRESS NOTE ADULT - SUBJECTIVE AND OBJECTIVE BOX
Podiatry Progress Note    Subjective:  DUANE RAMÍREZ is a  28y Female.   Seen bedside.   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      Tethered spinal cord  11/1993 SECONDARY TO CYSTIC TERATOMA      Galloway procedure  2006           Objective:  Vital Signs Last 24 Hrs  T(C): 36.6 (15 Aug 2023 08:38), Max: 36.7 (14 Aug 2023 12:29)  T(F): 97.8 (15 Aug 2023 08:38), Max: 98.1 (14 Aug 2023 15:45)  HR: 78 (15 Aug 2023 08:38) (57 - 90)  BP: 119/78 (15 Aug 2023 08:38) (108/57 - 149/91)  BP(mean): --  RR: 18 (15 Aug 2023 08:38) (14 - 21)  SpO2: 97% (15 Aug 2023 08:38) (96% - 98%)    Parameters below as of 15 Aug 2023 08:38  Patient On (Oxygen Delivery Method): room air                            13.4   9.55  )-----------( 275      ( 13 Aug 2023 15:46 )             42.1                 08-13    136  |  103  |  12  ----------------------------<  92  4.7   |  20  |  1.0    Ca    9.9      13 Aug 2023 15:46  Mg     1.9     08-13    TPro  7.5  /  Alb  4.8  /  TBili  0.7  /  DBili  x   /  AST  16  /  ALT  15  /  AlkPhos  74  08-13        Physical Exam - Lower Extremity Focused:   Derm: left heel full thickness wound with granular wound base, no drainage, no probe to bone. Surrounding healthy tissue with no erythema no edema.  Vascular: DP and PT Pulses palpable; Foot is Warm to Warm to the touch; Capillary Refill Time < 3 Seconds;    Neuro: Protective Sensation intact  MSK: Mild Pain On Palpation at Wound Site     Assessment:  S/p soft tissue debridement of wound of the left heel (8/14)    Plan:  Chart reviewed and Patient evaluated. All Questions and Concerns Addressed and Answered  Local Wound Care; Wound Flushed w/ NS; Wound Packed w/santyl/ xeroform/ DSD / Kerlix   Weight Bearing Status; WBAT   Continue w/ Local Wound Care; Q24 Dressing Changes;  No Further Sx Debridement from podiatry; burn consulted for potential skin graft or wound vac applications   Patient Stable Per Podiatry Standpoint; Follow Up as an Outpatient w/ Dr. Ferrari ;   Discussed Plan w/ Attending;     Podiatry        Podiatry Progress Note    Subjective:  DUANE RAMÍREZ is a  28y Female.   Seen bedside.   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      Tethered spinal cord  11/1993 SECONDARY TO CYSTIC TERATOMA      Galloway procedure  2006           Objective:  Vital Signs Last 24 Hrs  T(C): 36.6 (15 Aug 2023 08:38), Max: 36.7 (14 Aug 2023 12:29)  T(F): 97.8 (15 Aug 2023 08:38), Max: 98.1 (14 Aug 2023 15:45)  HR: 78 (15 Aug 2023 08:38) (57 - 90)  BP: 119/78 (15 Aug 2023 08:38) (108/57 - 149/91)  BP(mean): --  RR: 18 (15 Aug 2023 08:38) (14 - 21)  SpO2: 97% (15 Aug 2023 08:38) (96% - 98%)    Parameters below as of 15 Aug 2023 08:38  Patient On (Oxygen Delivery Method): room air                            13.4   9.55  )-----------( 275      ( 13 Aug 2023 15:46 )             42.1                 08-13    136  |  103  |  12  ----------------------------<  92  4.7   |  20  |  1.0    Ca    9.9      13 Aug 2023 15:46  Mg     1.9     08-13    TPro  7.5  /  Alb  4.8  /  TBili  0.7  /  DBili  x   /  AST  16  /  ALT  15  /  AlkPhos  74  08-13        Physical Exam - Lower Extremity Focused:   Derm: left heel full thickness wound with granular wound base, no drainage, no probe to bone. Surrounding healthy tissue with no erythema no edema.  Vascular: DP and PT Pulses palpable; Foot is Warm to Warm to the touch; Capillary Refill Time < 3 Seconds;    Neuro: Protective Sensation intact  MSK: Mild Pain On Palpation at Wound Site     Assessment:  S/p soft tissue debridement of wound of the left heel (8/14)    Plan:  Chart reviewed and Patient evaluated. All Questions and Concerns Addressed and Answered  Local Wound Care; Wound Flushed w/ NS; Wound Packed w/santyl/ xeroform/ DSD / Kerlix    Weight Bearing Status; WBAT in a CAM boot   Continue w/ Local Wound Care; Q24 Dressing Changes;  Off loading boot LLE   No Further Sx Debridement from podiatry; burn consulted for potential skin graft or wound vac applications   Patient Stable Per Podiatry Standpoint; Follow Up as an Outpatient w/ Dr. Ferrari ;   Discussed Plan w/ Attending;     Podiatry

## 2023-08-15 NOTE — PROGRESS NOTE ADULT - ASSESSMENT
28 yr old f w/ a pmh significant for neurogenic bladder and anxiety who presents for left heel pressure ulcer from cast that was initially treated medically with no improvement leading to admission for surgical debridement and subsequent skin grafting.    #L heel pressure ulcer  -s/p left foot arthrodesis on 6/23, pressure ulcer found on cast removal follow up appt with Dr. Ferrari   -s/p debridement on 8/14  -no need for antibiotics  -venous and arterial duplex normal 8/13  -X ray shows no signs of OM  -Percocet 5/325mg once daily for pain  -Burn consulted for potential skin grafting- pt to be taken down for procedure Thursday 8/17    #Anxiety  -c/w Hydroxyzine 50mg HS PRN  -Patient says she takes clonidine PRN for anxiety- Monitor off for now      Progress Note Handoff    Code Status: FULL CODE  DVT prophylaxis: lovenox  GI prophylaxis: none  Diet: DASH  Pending: skin graft by burn thursday 8/17  Disposition: Home___X__/SNF______/Other_____/Unknown at this time_____   28 yr old f w/ a pmh significant for neurogenic bladder and anxiety who presents for left heel pressure ulcer from cast that was initially treated medically with no improvement leading to admission for surgical debridement and subsequent skin grafting.    #L heel pressure ulcer  -s/p left foot arthrodesis on 6/23, pressure ulcer found on cast removal follow up appt with Dr. Ferrari   -s/p debridement on 8/14  -no need for antibiotics  -venous and arterial duplex normal 8/13  -X ray negative for OM  Plan:  -Local wound care   -Percocet 5/325mg once daily for pain  -Burn consulted for potential skin grafting- pt to be taken down for procedure Thursday 8/17  -podiatry recs no further debridement     #Anxiety  -c/w Hydroxyzine 50mg HS PRN  -Patient says she takes clonidine PRN for anxiety- Monitor off for now    Progress Note Handoff    Code Status: FULL CODE  DVT prophylaxis: lovenox  GI prophylaxis: none  Diet: DASH  Pending: skin graft by burn thursday 8/17  Disposition: Home___X__/SNF______/Other_____/Unknown at this time_____   28 yr old f w/ a pmh significant for neurogenic bladder and anxiety who presents for left heel pressure ulcer from cast that was initially treated medically with no improvement leading to admission for surgical debridement and subsequent skin grafting.    #L heel pressure ulcer  -s/p left foot arthrodesis on 6/23, pressure ulcer found on cast removal follow up appt with Dr. Ferrari   -s/p debridement on 8/14  -no need for antibiotics  -venous and arterial duplex normal 8/13  -X ray negative for OM  Plan:  -Local wound care   -Percocet 5/325mg once daily for pain  -Burn consulted for potential skin grafting- pt to be taken down for procedure Thursday 8/17; fu Burn recs   -podiatry recs no further debridement     #Anxiety  -c/w Hydroxyzine 50mg HS PRN  -Patient says she takes clonidine PRN for anxiety- Monitor off for now    Progress Note Handoff    Code Status: FULL CODE  DVT prophylaxis: lovenox  GI prophylaxis: none  Diet: DASH  Pending: skin graft by burn thursday 8/17  Disposition: Home___X__/SNF______/Other_____/Unknown at this time_____   28 yr old f w/ a pmh significant for neurogenic bladder and anxiety who presents for left heel pressure ulcer from cast that was initially treated medically with no improvement leading to admission for surgical debridement and subsequent skin grafting.    #L heel pressure ulcer  -s/p left foot arthrodesis on 6/23, pressure ulcer found on cast removal follow up appt with Dr. Ferrari   -s/p debridement on 8/14  -no need for antibiotics  -venous and arterial duplex normal 8/13  -X ray negative for OM  Plan:  -Local wound care   -Percocet 5/325mg once daily for pain  -Burn consulted for skin grafting-> pt to be taken down for procedure on 8/17 or 8/18; fu Burn recs   -stable and no need for further debridement from podiatry perspective     #Anxiety  -c/w Hydroxyzine 50mg HS PRN  -Patient says she takes clonidine PRN for anxiety- Monitor off for now    Progress Note Handoff    Code Status: FULL CODE  DVT prophylaxis: lovenox  GI prophylaxis: none  Diet: DASH  Pending: skin graft by burn thursday 8/17  Disposition: Home___X__/SNF______/Other_____/Unknown at this time_____

## 2023-08-15 NOTE — PROGRESS NOTE ADULT - SUBJECTIVE AND OBJECTIVE BOX
Patient is a 28y old  Female who presents with a chief complaint of pressure ulcer (15 Aug 2023 12:01)    28 yr old f w/ a pmh significant for neurogenic bladder and anxiety who presents for left heel pressure ulcer. At baseline, AO4, completely functional.    Patient had arthrodesis of comminuted LisFranc fx on 6/23/23, had a splint for 1 week after which she developed left heel pressure ulcer. The ulcer is not improving but is also not worsening. Patient tried 5 days of bactrim (today day 5) without any change in appearance of ulcer. As per patient no purulent drainage or pain at rest. Patient endorse some tenderness to palpation and mild surrounding erythema which is not worsening. Denies any numbness, tingling, swelling. Denies HA, dizziness, NVD, fever, SOB, chest pain, abdominal pain, change in urination and change in bowel habits.    Patient seen and examined at bedside.    ALLERGIES:  No Known Allergies    MEDICATIONS:  collagenase Ointment 1 Application(s) Topical daily  enoxaparin Injectable 40 milliGRAM(s) SubCutaneous every 24 hours  hydrOXYzine hydrochloride 50 milliGRAM(s) Oral at bedtime PRN  oxycodone    5 mG/acetaminophen 325 mG 1 Tablet(s) Oral daily    Vital Signs Last 24 Hrs  T(F): 98.2 (15 Aug 2023 15:30), Max: 98.2 (15 Aug 2023 15:30)  HR: 70 (15 Aug 2023 15:30) (70 - 80)  BP: 134/77 (15 Aug 2023 15:30) (118/77 - 134/77)  RR: 15 (15 Aug 2023 15:30) (15 - 18)  SpO2: 97% (15 Aug 2023 08:38) (97% - 97%)  I&O's Summary    14 Aug 2023 07:01  -  15 Aug 2023 07:00  --------------------------------------------------------  IN: 0 mL / OUT: 575 mL / NET: -575 mL        PHYSICAL EXAM:  General: NAD, A/O x 3  ENT: MMM  Neck: Supple, No JVD  Lungs: Clear to auscultation bilaterally  Cardio: RRR, S1/S2, No murmurs  Abdomen: Soft, Nontender, Nondistended; Bowel sounds present  Extremities: No cyanosis, left foot in dressing     LABS:                        13.4   9.55  )-----------( 275      ( 13 Aug 2023 15:46 )             42.1     08-13    136  |  103  |  12  ----------------------------<  92  4.7   |  20  |  1.0    Ca    9.9      13 Aug 2023 15:46  Mg     1.9     08-13    TPro  7.5  /  Alb  4.8  /  TBili  0.7  /  DBili  x   /  AST  16  /  ALT  15  /  AlkPhos  74  08-13      PT/INR - ( 13 Aug 2023 21:01 )   PT: 11.80 sec;   INR: 1.03 ratio         PTT - ( 13 Aug 2023 21:01 )  PTT:33.2 sec  Lactate, Blood: 1.6 mmol/L (08-13 @ 15:46)                          Urinalysis Basic - ( 13 Aug 2023 15:46 )    Color: x / Appearance: x / SG: x / pH: x  Gluc: 92 mg/dL / Ketone: x  / Bili: x / Urobili: x   Blood: x / Protein: x / Nitrite: x   Leuk Esterase: x / RBC: x / WBC x   Sq Epi: x / Non Sq Epi: x / Bacteria: x        Culture - Blood (collected 13 Aug 2023 15:46)  Source: .Blood Blood-Peripheral  Preliminary Report (15 Aug 2023 01:02):    No growth at 24 hours    Culture - Blood (collected 13 Aug 2023 15:46)  Source: .Blood Blood-Peripheral  Preliminary Report (15 Aug 2023 01:02):    No growth at 24 hours          RADIOLOGY & ADDITIONAL TESTS:    Care Discussed with Consultants/Other Providers:

## 2023-08-15 NOTE — PROGRESS NOTE ADULT - ASSESSMENT
28 yr old f w/ a pmh significant for neurogenic bladder and anxiety who presents for left heel pressure ulcer from cast that was initially treated medically with no improvement leading to admission for surgical debridement and subsequent skin grafting.    #L heel pressure ulcer  -s/p left foot arthrodesis on 6/23, pressure ulcer found on cast removal follow up appt with Dr. Ferrari   -s/p debridement on 8/14  -no need for antibiotics  -venous and arterial duplex normal 8/13  -Local wound care   -Percocet 5/325mg once daily for pain  -Burn consulted for skin grafting-> pt to be taken down for procedure on 8/17 or 8/18; fu Burn recs   -stable and no need for further debridement from podiatry perspective     #Anxiety  -c/w Hydroxyzine 50mg HS PRN  -Patient says she takes clonidine PRN for anxiety- Monitor off for now    Progress Note Handoff    Code Status: FULL CODE  DVT prophylaxis: lovenox  GI prophylaxis: none  Diet: DASH  Pending: skin graft by burn thursday 8/17  Disposition: home

## 2023-08-16 LAB
-  AMIKACIN: SIGNIFICANT CHANGE UP
-  AMOXICILLIN/CLAVULANIC ACID: SIGNIFICANT CHANGE UP
-  AMPICILLIN/SULBACTAM: SIGNIFICANT CHANGE UP
-  AMPICILLIN: SIGNIFICANT CHANGE UP
-  AZTREONAM: SIGNIFICANT CHANGE UP
-  CEFAZOLIN: SIGNIFICANT CHANGE UP
-  CEFEPIME: SIGNIFICANT CHANGE UP
-  CEFOXITIN: SIGNIFICANT CHANGE UP
-  CEFTRIAXONE: SIGNIFICANT CHANGE UP
-  CIPROFLOXACIN: SIGNIFICANT CHANGE UP
-  CLINDAMYCIN: SIGNIFICANT CHANGE UP
-  CLINDAMYCIN: SIGNIFICANT CHANGE UP
-  ERTAPENEM: SIGNIFICANT CHANGE UP
-  ERYTHROMYCIN: SIGNIFICANT CHANGE UP
-  ERYTHROMYCIN: SIGNIFICANT CHANGE UP
-  GENTAMICIN: SIGNIFICANT CHANGE UP
-  IMIPENEM: SIGNIFICANT CHANGE UP
-  IMIPENEM: SIGNIFICANT CHANGE UP
-  LEVOFLOXACIN: SIGNIFICANT CHANGE UP
-  MEROPENEM: SIGNIFICANT CHANGE UP
-  OXACILLIN: SIGNIFICANT CHANGE UP
-  OXACILLIN: SIGNIFICANT CHANGE UP
-  PENICILLIN: SIGNIFICANT CHANGE UP
-  PENICILLIN: SIGNIFICANT CHANGE UP
-  PIPERACILLIN/TAZOBACTAM: SIGNIFICANT CHANGE UP
-  RIFAMPIN: SIGNIFICANT CHANGE UP
-  RIFAMPIN: SIGNIFICANT CHANGE UP
-  TETRACYCLINE: SIGNIFICANT CHANGE UP
-  TETRACYCLINE: SIGNIFICANT CHANGE UP
-  TOBRAMYCIN: SIGNIFICANT CHANGE UP
-  TRIMETHOPRIM/SULFAMETHOXAZOLE: SIGNIFICANT CHANGE UP
-  VANCOMYCIN: SIGNIFICANT CHANGE UP
-  VANCOMYCIN: SIGNIFICANT CHANGE UP
ANION GAP SERPL CALC-SCNC: 15 MMOL/L — HIGH (ref 7–14)
APTT BLD: 34.4 SEC — SIGNIFICANT CHANGE UP (ref 27–39.2)
BASOPHILS # BLD AUTO: 0.05 K/UL — SIGNIFICANT CHANGE UP (ref 0–0.2)
BASOPHILS NFR BLD AUTO: 0.5 % — SIGNIFICANT CHANGE UP (ref 0–1)
BLD GP AB SCN SERPL QL: SIGNIFICANT CHANGE UP
BUN SERPL-MCNC: 11 MG/DL — SIGNIFICANT CHANGE UP (ref 10–20)
CALCIUM SERPL-MCNC: 9.9 MG/DL — SIGNIFICANT CHANGE UP (ref 8.4–10.4)
CHLORIDE SERPL-SCNC: 105 MMOL/L — SIGNIFICANT CHANGE UP (ref 98–110)
CO2 SERPL-SCNC: 22 MMOL/L — SIGNIFICANT CHANGE UP (ref 17–32)
CREAT SERPL-MCNC: 0.7 MG/DL — SIGNIFICANT CHANGE UP (ref 0.7–1.5)
EGFR: 119 ML/MIN/1.73M2 — SIGNIFICANT CHANGE UP
EOSINOPHIL # BLD AUTO: 0.43 K/UL — SIGNIFICANT CHANGE UP (ref 0–0.7)
EOSINOPHIL NFR BLD AUTO: 4.2 % — SIGNIFICANT CHANGE UP (ref 0–8)
GLUCOSE SERPL-MCNC: 91 MG/DL — SIGNIFICANT CHANGE UP (ref 70–99)
HCG SERPL QL: NEGATIVE — SIGNIFICANT CHANGE UP
HCT VFR BLD CALC: 42.9 % — SIGNIFICANT CHANGE UP (ref 37–47)
HGB BLD-MCNC: 13.8 G/DL — SIGNIFICANT CHANGE UP (ref 12–16)
IMM GRANULOCYTES NFR BLD AUTO: 0.4 % — HIGH (ref 0.1–0.3)
INR BLD: 1.03 RATIO — SIGNIFICANT CHANGE UP (ref 0.65–1.3)
LYMPHOCYTES # BLD AUTO: 2.45 K/UL — SIGNIFICANT CHANGE UP (ref 1.2–3.4)
LYMPHOCYTES # BLD AUTO: 24.1 % — SIGNIFICANT CHANGE UP (ref 20.5–51.1)
MAGNESIUM SERPL-MCNC: 2 MG/DL — SIGNIFICANT CHANGE UP (ref 1.8–2.4)
MCHC RBC-ENTMCNC: 25.2 PG — LOW (ref 27–31)
MCHC RBC-ENTMCNC: 32.2 G/DL — SIGNIFICANT CHANGE UP (ref 32–37)
MCV RBC AUTO: 78.3 FL — LOW (ref 81–99)
METHOD TYPE: SIGNIFICANT CHANGE UP
MONOCYTES # BLD AUTO: 0.76 K/UL — HIGH (ref 0.1–0.6)
MONOCYTES NFR BLD AUTO: 7.5 % — SIGNIFICANT CHANGE UP (ref 1.7–9.3)
NEUTROPHILS # BLD AUTO: 6.45 K/UL — SIGNIFICANT CHANGE UP (ref 1.4–6.5)
NEUTROPHILS NFR BLD AUTO: 63.3 % — SIGNIFICANT CHANGE UP (ref 42.2–75.2)
NRBC # BLD: 0 /100 WBCS — SIGNIFICANT CHANGE UP (ref 0–0)
PLATELET # BLD AUTO: 306 K/UL — SIGNIFICANT CHANGE UP (ref 130–400)
PMV BLD: 12.4 FL — HIGH (ref 7.4–10.4)
POTASSIUM SERPL-MCNC: 4.5 MMOL/L — SIGNIFICANT CHANGE UP (ref 3.5–5)
POTASSIUM SERPL-SCNC: 4.5 MMOL/L — SIGNIFICANT CHANGE UP (ref 3.5–5)
PROTHROM AB SERPL-ACNC: 11.7 SEC — SIGNIFICANT CHANGE UP (ref 9.95–12.87)
RBC # BLD: 5.48 M/UL — HIGH (ref 4.2–5.4)
RBC # FLD: 13.9 % — SIGNIFICANT CHANGE UP (ref 11.5–14.5)
SODIUM SERPL-SCNC: 142 MMOL/L — SIGNIFICANT CHANGE UP (ref 135–146)
WBC # BLD: 10.18 K/UL — SIGNIFICANT CHANGE UP (ref 4.8–10.8)
WBC # FLD AUTO: 10.18 K/UL — SIGNIFICANT CHANGE UP (ref 4.8–10.8)

## 2023-08-16 PROCEDURE — 99232 SBSQ HOSP IP/OBS MODERATE 35: CPT

## 2023-08-16 PROCEDURE — 71045 X-RAY EXAM CHEST 1 VIEW: CPT | Mod: 26

## 2023-08-16 PROCEDURE — 99231 SBSQ HOSP IP/OBS SF/LOW 25: CPT | Mod: 24

## 2023-08-16 RX ORDER — LANOLIN ALCOHOL/MO/W.PET/CERES
3 CREAM (GRAM) TOPICAL AT BEDTIME
Refills: 0 | Status: DISCONTINUED | OUTPATIENT
Start: 2023-08-16 | End: 2023-08-17

## 2023-08-16 RX ORDER — CEFTRIAXONE 500 MG/1
1000 INJECTION, POWDER, FOR SOLUTION INTRAMUSCULAR; INTRAVENOUS EVERY 24 HOURS
Refills: 0 | Status: DISCONTINUED | OUTPATIENT
Start: 2023-08-17 | End: 2023-08-17

## 2023-08-16 RX ORDER — SODIUM CHLORIDE 9 MG/ML
1000 INJECTION, SOLUTION INTRAVENOUS
Refills: 0 | Status: DISCONTINUED | OUTPATIENT
Start: 2023-08-16 | End: 2023-08-17

## 2023-08-16 RX ORDER — CEFTRIAXONE 500 MG/1
1000 INJECTION, POWDER, FOR SOLUTION INTRAMUSCULAR; INTRAVENOUS ONCE
Refills: 0 | Status: COMPLETED | OUTPATIENT
Start: 2023-08-16 | End: 2023-08-16

## 2023-08-16 RX ORDER — CEFTRIAXONE 500 MG/1
INJECTION, POWDER, FOR SOLUTION INTRAMUSCULAR; INTRAVENOUS
Refills: 0 | Status: DISCONTINUED | OUTPATIENT
Start: 2023-08-16 | End: 2023-08-17

## 2023-08-16 RX ADMIN — Medication 1 APPLICATION(S): at 13:42

## 2023-08-16 RX ADMIN — CEFTRIAXONE 1000 MILLIGRAM(S): 500 INJECTION, POWDER, FOR SOLUTION INTRAMUSCULAR; INTRAVENOUS at 10:50

## 2023-08-16 RX ADMIN — Medication 3 MILLIGRAM(S): at 22:17

## 2023-08-16 RX ADMIN — ENOXAPARIN SODIUM 40 MILLIGRAM(S): 100 INJECTION SUBCUTANEOUS at 17:15

## 2023-08-16 NOTE — PROGRESS NOTE ADULT - ASSESSMENT
28 yr old f w/ a pmh significant for neurogenic bladder and anxiety who presents for left heel pressure ulcer from cast that was initially treated medically with no improvement leading to admission, now s/p first debridement on  and pending second debridement and possible skin graft on .     #L heel pressure ulcer  -s/p left foot arthrodesis on , pressure ulcer found on cast removal follow up appt with Dr. Ferrari   -s/p debridement on   -no need for antibiotics  -venous and arterial duplex normal   -X ray negative for OM  Plan:  -Local wound care   -Percocet 5/325mg once daily for pain  -fu wound culture from 1st debridement -> prelim read: few E coli, few Serratia marcescens, moderate Staph epidermidis, few Staph aureus  -Per Burn: pt scheduled for 2nd debridement and skin grafting on . NPO after midnight. Preop labs ordered: CBC, BMP, phos, Mg, coag, Type and screen.     #Anxiety  -c/w Hydroxyzine 50mg HS PRN  -Patient says she takes clonidine PRN for anxiety- Monitor off for now    Code Status: FULL CODE  DVT prophylaxis: lovenox  GI prophylaxis: none  Diet: DASH  Pendinnd OR debridement on   Disposition: Home___X__/SNF______/Other_____/Unknown at this time_____

## 2023-08-16 NOTE — PROGRESS NOTE ADULT - SUBJECTIVE AND OBJECTIVE BOX
PROGRESS NOTE   Pt seen @ bedside during PM rounds. S/p L Heel Dbx, Dressing +Clean, +Dry, + Intact      Vital Signs Last 24 Hrs  T(C): 36.2 (16 Aug 2023 07:45), Max: 36.8 (15 Aug 2023 15:30)  T(F): 97.2 (16 Aug 2023 07:45), Max: 98.2 (15 Aug 2023 15:30)  HR: 73 (16 Aug 2023 07:45) (70 - 74)  BP: 107/65 (16 Aug 2023 07:45) (107/65 - 134/77)  BP(mean): --  RR: 18 (16 Aug 2023 07:45) (15 - 18)  SpO2: 98% (16 Aug 2023 07:45) (98% - 98%)    Parameters below as of 16 Aug 2023 07:45  Patient On (Oxygen Delivery Method): room air                          Physical Exam - Lower Extremity Focused:   Derm: left heel full thickness wound with granular wound base, no drainage, no probe to bone. Surrounding healthy tissue with no erythema no edema.  Vascular: DP and PT Pulses palpable; Foot is Warm to Warm to the touch; Capillary Refill Time < 3 Seconds;    Neuro: Protective Sensation intact  MSK: Mild Pain On Palpation at Wound Site     Assessment:  S/p soft tissue debridement of wound of the left heel (8/14)    Plan:  Chart reviewed and Patient evaluated. All Questions and Concerns Addressed and Answered  Weight Bearing Status; WBAT in a CAM boot   Wound care as per Burn team  Off loading boot LLE   F/u with Burn regarding Dbx and STSG L heel  Podiatry

## 2023-08-16 NOTE — PROGRESS NOTE ADULT - SUBJECTIVE AND OBJECTIVE BOX
Patient is a 28y old Female who presents with a chief complaint of left heel pressure ulcer (15 Aug 2023 12:01)    28 yr old f w/ a pmh significant for neurogenic bladder and anxiety who presents for left heel pressure ulcer. At baseline, AO4, completely functional.    Patient had arthrodesis of comminuted LisFranc fx on 6/23/23, had a splint for 1 week after which she developed left heel pressure ulcer. The ulcer is not improving but is also not worsening. Patient tried 5 days of bactrim (today day 5) without any change in appearance of ulcer. As per patient no purulent drainage or pain at rest. Patient endorse some tenderness to palpation and mild surrounding erythema which is not worsening. Denies any numbness, tingling, swelling. Denies HA, dizziness, NVD, fever, SOB, chest pain, abdominal pain, change in urination and change in bowel habits.    Patient seen and examined at bedside.    ALLERGIES:  No Known Allergies    Vital Signs Last 24 Hrs  T(C): 36.2 (16 Aug 2023 07:45), Max: 36.8 (15 Aug 2023 15:30)  T(F): 97.2 (16 Aug 2023 07:45), Max: 98.2 (15 Aug 2023 15:30)  HR: 73 (16 Aug 2023 07:45) (70 - 74)  BP: 107/65 (16 Aug 2023 07:45) (107/65 - 134/77)  BP(mean): --  RR: 18 (16 Aug 2023 07:45) (15 - 18)  SpO2: 98% (16 Aug 2023 07:45) (98% - 98%)    Parameters below as of 16 Aug 2023 07:45  Patient On (Oxygen Delivery Method): room air      PHYSICAL EXAM:  General: NAD, A/O x 3  ENT: MMM  Neck: Supple, No JVD  Lungs: Clear to auscultation bilaterally  Cardio: RRR, S1/S2, No murmurs  Abdomen: Soft, Nontender, Nondistended; Bowel sounds present, obese  Extremities: No cyanosis, left foot in dressing     LABS:                        13.4   9.55  )-----------( 275      ( 13 Aug 2023 15:46 )             42.1     08-13    136  |  103  |  12  ----------------------------<  92  4.7   |  20  |  1.0    Ca    9.9      13 Aug 2023 15:46  Mg     1.9     08-13    TPro  7.5  /  Alb  4.8  /  TBili  0.7  /  DBili  x   /  AST  16  /  ALT  15  /  AlkPhos  74  08-13      PT/INR - ( 13 Aug 2023 21:01 )   PT: 11.80 sec;   INR: 1.03 ratio         PTT - ( 13 Aug 2023 21:01 )  PTT:33.2 sec  Lactate, Blood: 1.6 mmol/L (08-13 @ 15:46)    Culture - Fungal, Other (collected 14 Aug 2023 20:00)  Source: .Other None  Preliminary Report (16 Aug 2023 06:56):    Testing in progress    Culture - Surgical Swab (collected 14 Aug 2023 20:00)  Source: .Surgical Swab None  Preliminary Report (15 Aug 2023 21:42):    Rare Escherichia coli    Culture - Surgical Swab (collected 14 Aug 2023 20:00)  Source: .Surgical Swab None  Preliminary Report (15 Aug 2023 21:45):    Few Escherichia coli    Few Serratia marcescens    Moderate Staphylococcus epidermidis    Few Staphylococcus aureus    Culture - Fungal, Other (collected 14 Aug 2023 20:00)  Source: .Other None  Preliminary Report (16 Aug 2023 06:55):    Testing in progress    Culture - Blood (collected 13 Aug 2023 15:46)  Source: .Blood Blood-Peripheral  Preliminary Report (16 Aug 2023 01:01):    No growth at 48 Hours    Culture - Blood (collected 13 Aug 2023 15:46)  Source: .Blood Blood-Peripheral  Preliminary Report (16 Aug 2023 01:01):    No growth at 48 Hours          RADIOLOGY & ADDITIONAL TESTS:  Care Discussed with Consultants/Other Providers: x        MEDICATIONS  (STANDING):  cefTRIAXone   IVPB      collagenase Ointment 1 Application(s) Topical daily  enoxaparin Injectable 40 milliGRAM(s) SubCutaneous every 24 hours  lactated ringers. 1000 milliLiter(s) (75 mL/Hr) IV Continuous <Continuous>  melatonin 3 milliGRAM(s) Oral at bedtime  oxycodone    5 mG/acetaminophen 325 mG 1 Tablet(s) Oral daily    MEDICATIONS  (PRN):  hydrOXYzine hydrochloride 50 milliGRAM(s) Oral at bedtime PRN Anxiety

## 2023-08-16 NOTE — PROGRESS NOTE ADULT - SUBJECTIVE AND OBJECTIVE BOX
24H events:    Patient is a 28y old Female who presents with a chief complaint of pressure ulcer (15 Aug 2023 16:58)    Primary diagnosis of Pressure ulcer    Today is hospital day 3d. This morning patient was seen and examined at bedside, resting comfortably in bed.    No acute or major events overnight.  Pt denies pain, numbness, tingling or weakness in left foot. Furthermore, denies fever, chest pain, dyspnea, abdo pain, nausea, vomiting, diarrhea or constipation.     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 1000 milliLiter(s) IV Continuous <Continuous>  oxycodone    5 mG/acetaminophen 325 mG 1 Tablet(s) Oral daily    PRN MEDICATIONS  hydrOXYzine hydrochloride 50 milliGRAM(s) Oral at bedtime PRN    VITALS:   T(F): 97.2  HR: 73  BP: 107/65  RR: 18  SpO2: 98%    PHYSICAL EXAM:  GENERAL:   (x  ) NAD, lying in bed comfortably       HEAD:   ( x ) Atraumatic          HEART:  Rate -->     (x  ) normal rate   Rhythm -->     ( x ) regular     Murmurs -->     (x  ) normal s1s2        LUNGS:   (x )Unlabored respirations      ( x ) B/L air entry      ( x ) no adventitious sound        ABDOMEN:   (x  ) Soft   (x ) nondistended  (  x) nontender        EXTREMITIES:  No LE edema    NERVOUS SYSTEM:    ( x ) A&Ox3      SKIN:   L foot ulcer on heel, wrapped in ACE bandage       LABS:    Culture - Fungal, Other (collected 14 Aug 2023 20:00)  Source: .Other None  Preliminary Report (16 Aug 2023 06:56):    Testing in progress    Culture - Surgical Swab (collected 14 Aug 2023 20:00)  Source: .Surgical Swab None  Preliminary Report (15 Aug 2023 21:42):    Rare Escherichia coli    Culture - Surgical Swab (collected 14 Aug 2023 20:00)  Source: .Surgical Swab None  Preliminary Report (15 Aug 2023 21:45):    Few Escherichia coli    Few Serratia marcescens    Moderate Staphylococcus epidermidis    Few Staphylococcus aureus    Culture - Fungal, Other (collected 14 Aug 2023 20:00)  Source: .Other None  Preliminary Report (16 Aug 2023 06:55):    Testing in progress    Culture - Blood (collected 13 Aug 2023 15:46)  Source: .Blood Blood-Peripheral  Preliminary Report (16 Aug 2023 01:01):    No growth at 48 Hours    Culture - Blood (collected 13 Aug 2023 15:46)  Source: .Blood Blood-Peripheral  Preliminary Report (16 Aug 2023 01:01):    No growth at 48 Hours          RADIOLOGY:    L foot xray IMPRESSION:  1.  Status post Lisfranc joint complex ORIF without evidence of   complication.    2.  Skin irregularity at the posterior aspect of the heel and bandage.   Ulcer is not excluded. No definite subjacent osteomyelitis is seen.

## 2023-08-16 NOTE — PROGRESS NOTE ADULT - ASSESSMENT
28 yr old f w/ a pmh significant for neurogenic bladder and anxiety who presents for left heel pressure ulcer from cast that was initially treated medically with no improvement leading to admission for surgical debridement and subsequent skin grafting.    #L heel pressure ulcer  -s/p left foot arthrodesis on 6/23, pressure ulcer found on cast removal follow up appt with Dr. Ferrari   -s/p debridement on 8/14  -no need for antibiotics as per ID   -venous and arterial duplex normal 8/13  -Local wound care as per nursing   -Percocet 5/325mg once daily for pain during dressing changes   -Burn consulted for skin grafting-> pt to be taken down for procedure on 8/17 or 8/18; fu Burn recs   -stable and no need for further debridement from podiatry perspective   -preop labs; NPO after midnight     #Anxiety  -c/w Hydroxyzine 50mg HS PRN    #obesity  -diet and lifestyle modification     Progress Note Handoff  Pending Consults: burn  Pending Tests: preop labs  Pending Results: as above  Family Discussion: Discussed labs, meds, pain control, burn follow up and overall plan of care with pt and medical staff. All questions answered. PFD 24-48hrs   Disposition: Home__x___/SNF______/Other_____/Unknown at this time_____  Spent over 55 min reviewing chart, speaking with patient/family and on coordinating patient care during interdisciplinary rounds

## 2023-08-17 LAB
ANION GAP SERPL CALC-SCNC: 12 MMOL/L — SIGNIFICANT CHANGE UP (ref 7–14)
BUN SERPL-MCNC: 11 MG/DL — SIGNIFICANT CHANGE UP (ref 10–20)
CALCIUM SERPL-MCNC: 9.9 MG/DL — SIGNIFICANT CHANGE UP (ref 8.4–10.5)
CHLORIDE SERPL-SCNC: 106 MMOL/L — SIGNIFICANT CHANGE UP (ref 98–110)
CO2 SERPL-SCNC: 22 MMOL/L — SIGNIFICANT CHANGE UP (ref 17–32)
CREAT SERPL-MCNC: 0.6 MG/DL — LOW (ref 0.7–1.5)
EGFR: 124 ML/MIN/1.73M2 — SIGNIFICANT CHANGE UP
GLUCOSE SERPL-MCNC: 100 MG/DL — HIGH (ref 70–99)
GRAM STN FLD: SIGNIFICANT CHANGE UP
HCT VFR BLD CALC: 38.8 % — SIGNIFICANT CHANGE UP (ref 37–47)
HGB BLD-MCNC: 12.4 G/DL — SIGNIFICANT CHANGE UP (ref 12–16)
MAGNESIUM SERPL-MCNC: 1.7 MG/DL — LOW (ref 1.8–2.4)
MCHC RBC-ENTMCNC: 24.8 PG — LOW (ref 27–31)
MCHC RBC-ENTMCNC: 32 G/DL — SIGNIFICANT CHANGE UP (ref 32–37)
MCV RBC AUTO: 77.8 FL — LOW (ref 81–99)
NRBC # BLD: 0 /100 WBCS — SIGNIFICANT CHANGE UP (ref 0–0)
PLATELET # BLD AUTO: 317 K/UL — SIGNIFICANT CHANGE UP (ref 130–400)
PMV BLD: 12.7 FL — HIGH (ref 7.4–10.4)
POTASSIUM SERPL-MCNC: 4.3 MMOL/L — SIGNIFICANT CHANGE UP (ref 3.5–5)
POTASSIUM SERPL-SCNC: 4.3 MMOL/L — SIGNIFICANT CHANGE UP (ref 3.5–5)
RBC # BLD: 4.99 M/UL — SIGNIFICANT CHANGE UP (ref 4.2–5.4)
RBC # FLD: 13.7 % — SIGNIFICANT CHANGE UP (ref 11.5–14.5)
SODIUM SERPL-SCNC: 140 MMOL/L — SIGNIFICANT CHANGE UP (ref 135–146)
SPECIMEN SOURCE: SIGNIFICANT CHANGE UP
SURGICAL PATHOLOGY STUDY: SIGNIFICANT CHANGE UP
WBC # BLD: 10.58 K/UL — SIGNIFICANT CHANGE UP (ref 4.8–10.8)
WBC # FLD AUTO: 10.58 K/UL — SIGNIFICANT CHANGE UP (ref 4.8–10.8)

## 2023-08-17 PROCEDURE — 99232 SBSQ HOSP IP/OBS MODERATE 35: CPT

## 2023-08-17 PROCEDURE — 11043 DBRDMT MUSC&/FSCA 1ST 20/<: CPT

## 2023-08-17 RX ORDER — HYDROMORPHONE HYDROCHLORIDE 2 MG/ML
1 INJECTION INTRAMUSCULAR; INTRAVENOUS; SUBCUTANEOUS
Refills: 0 | Status: DISCONTINUED | OUTPATIENT
Start: 2023-08-17 | End: 2023-08-17

## 2023-08-17 RX ORDER — CEFTRIAXONE 500 MG/1
1000 INJECTION, POWDER, FOR SOLUTION INTRAMUSCULAR; INTRAVENOUS EVERY 24 HOURS
Refills: 0 | Status: DISCONTINUED | OUTPATIENT
Start: 2023-08-17 | End: 2023-08-20

## 2023-08-17 RX ORDER — ONDANSETRON 8 MG/1
4 TABLET, FILM COATED ORAL ONCE
Refills: 0 | Status: DISCONTINUED | OUTPATIENT
Start: 2023-08-17 | End: 2023-08-17

## 2023-08-17 RX ORDER — LANOLIN ALCOHOL/MO/W.PET/CERES
3 CREAM (GRAM) TOPICAL AT BEDTIME
Refills: 0 | Status: DISCONTINUED | OUTPATIENT
Start: 2023-08-17 | End: 2023-08-20

## 2023-08-17 RX ORDER — ACETAMINOPHEN 500 MG
650 TABLET ORAL ONCE
Refills: 0 | Status: COMPLETED | OUTPATIENT
Start: 2023-08-17 | End: 2023-08-17

## 2023-08-17 RX ORDER — HYDROMORPHONE HYDROCHLORIDE 2 MG/ML
0.5 INJECTION INTRAMUSCULAR; INTRAVENOUS; SUBCUTANEOUS
Refills: 0 | Status: DISCONTINUED | OUTPATIENT
Start: 2023-08-17 | End: 2023-08-17

## 2023-08-17 RX ORDER — ENOXAPARIN SODIUM 100 MG/ML
40 INJECTION SUBCUTANEOUS EVERY 24 HOURS
Refills: 0 | Status: DISCONTINUED | OUTPATIENT
Start: 2023-08-17 | End: 2023-08-20

## 2023-08-17 RX ORDER — HYDROXYZINE HCL 10 MG
50 TABLET ORAL AT BEDTIME
Refills: 0 | Status: DISCONTINUED | OUTPATIENT
Start: 2023-08-17 | End: 2023-08-20

## 2023-08-17 RX ADMIN — Medication 3 MILLIGRAM(S): at 21:56

## 2023-08-17 RX ADMIN — Medication 50 MILLIGRAM(S): at 21:56

## 2023-08-17 RX ADMIN — CEFTRIAXONE 100 MILLIGRAM(S): 500 INJECTION, POWDER, FOR SOLUTION INTRAMUSCULAR; INTRAVENOUS at 12:16

## 2023-08-17 RX ADMIN — ENOXAPARIN SODIUM 40 MILLIGRAM(S): 100 INJECTION SUBCUTANEOUS at 12:16

## 2023-08-17 NOTE — PRE-ANESTHESIA EVALUATION ADULT - HEIGHT IN FEET
- SOB multifactorial, 2/2 lung cancer, PNA, HF, and acute leukemia  - was fluid overloaded on exam with 1+ b/l pitting edema to the knee, diffuse crackles on lung exam   - given borderline hypotension, will hold lasix, TTE ordered   - EKG showed NSR, no ST elevation/depression, T wave inversion, negative troponin   - CT angio negative for PE, but showed 4.7cm mass in EMILE, and 1.3cm mass in RUL, Mild right lower lobe pneumonia/aspiration  - currently does not require supplement O2, can give supplement O2 PRN   - VBG showed pH 7.47, lactate of 5.2  - c/w vanco and zosyn for PNA, vanco bilevel
5
5
- SOB multifactorial, 2/2 lung cancer, PNA, HF, and acute leukemia  - was fluid overloaded on exam with 1+ b/l pitting edema to the knee; lungs clear this AM  - given borderline hypotension, will hold lasix, TTE ordered   - EKG showed NSR, no ST elevation/depression, T wave inversion, negative troponin   - CT angio negative for PE, but showed 4.7cm mass in EMILE, and 1.3cm mass in RUL, Mild right lower lobe pneumonia/aspiration  - currently does not require supplement O2, can give supplement O2 PRN   - VBG showed pH 7.47, lactate of 5.2  - c/w vanco and zosyn for PNA, vanco bilevel

## 2023-08-17 NOTE — CHART NOTE - NSCHARTNOTEFT_GEN_A_CORE
PACU ANESTHESIA ADMISSION NOTE  ____  Intubated  TV:______       Rate: ______      FiO2: ______  x____  Patent Airway  x____  Full return of protective reflexes  x____  Full recovery from anesthesia / back to baseline   Mental Status:    x____ Awake    ____ Alert     ____ Drowsy    ____ Sedated  Nausea/Vomiting:   x____ NO    ____ Yes,   See Post - Op Orders        Pain Scale (0-10):    ____ Treatment:   x____ None      ____ See Post - Op/PCA Orders  Post - Operative Fluids:    x____ Oral     ____ See Post - Op Orders  Plan: Discharge:     ____Home         x_____Floor       _____Critical Care      _____  Other:_________________    Comments:

## 2023-08-17 NOTE — PROGRESS NOTE ADULT - ASSESSMENT
28 yr old f w/ a pmh significant for neurogenic bladder and anxiety who presents for left heel pressure ulcer from cast that was initially treated medically with no improvement leading to admission for surgical debridement and subsequent skin grafting.    #L heel pressure ulcer  -s/p left foot arthrodesis on 6/23, pressure ulcer found on cast removal follow up appt with Dr. Ferrari   -s/p debridement on 8/14  -no need for antibiotics as per ID   -venous and arterial duplex normal 8/13  -Local wound care as per nursing   -Percocet 5/325mg once daily for pain during dressing changes   -Burn consulted for skin grafting-> taken to the OR on 8/17; skin graft not performed and pt had wound vac placed   -stable and no need for further debridement from podiatry perspective   -await burn to complete paperwork for wound vac; pt will need auth for wound va    #Anxiety  -c/w Hydroxyzine 50mg HS PRN    #obesity  -diet and lifestyle modification     Progress Note Handoff  Pending Consults: burn  Pending Tests: none  Pending Results: cultures  Family Discussion: Discussed labs, meds, pain control, burn follow up, wound vac and overall plan of care with pt and medical staff. All questions answered. PFD 24-48hrs once wound vac approved  Disposition: Home__x___/SNF______/Other_____/Unknown at this time_____  Spent over 55 min reviewing chart, speaking with patient/family and on coordinating patient care during interdisciplinary rounds

## 2023-08-17 NOTE — BRIEF OPERATIVE NOTE - NSICDXBRIEFPROCEDURE_GEN_ALL_CORE_FT
PROCEDURES:  Selective debridement 17-Aug-2023 21:57:33 excisional debridement with scalpel lef Everardo Carter  
PROCEDURES:  Debridement, sharp, muscle, foot 14-Aug-2023 14:05:27  Maria M Ferrari

## 2023-08-17 NOTE — PROGRESS NOTE ADULT - SUBJECTIVE AND OBJECTIVE BOX
24H events:    Patient is a 28y old Female who presents with a chief complaint of L foot ulcer (16 Aug 2023 12:39)    Primary diagnosis of Pressure ulcer    Today is hospital day 4d. This morning patient was seen and examined at bedside, resting comfortably in bed.    No acute or major events overnight.  Pt taken for skin graft today but not done bc not enough vascularization yet per burn PA.   Pt feels well after procedure. Denies pain, numbness, tingling in L foot. Furthermore, denies fever, chest pain, dyspnea, abdo pain, nausea, vomiting, constipation, diarrhea.    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    Tethered spinal cord  11/1993 SECONDARY TO CYSTIC TERATOMA    Galloway procedure  2006      SOCIAL HISTORY:  Social History:      ALLERGIES:  No Known Allergies    MEDICATIONS:  STANDING MEDICATIONS  cefTRIAXone   IVPB 1000 milliGRAM(s) IV Intermittent every 24 hours  enoxaparin Injectable 40 milliGRAM(s) SubCutaneous every 24 hours  melatonin 3 milliGRAM(s) Oral at bedtime  oxycodone    5 mG/acetaminophen 325 mG 1 Tablet(s) Oral daily    PRN MEDICATIONS  hydrOXYzine hydrochloride 50 milliGRAM(s) Oral at bedtime PRN    VITALS:   T(F): 97.6  HR: 62  BP: 128/61  RR: 18  SpO2: 98%    PHYSICAL EXAM:  GENERAL:   (x  ) NAD, lying in bed comfortably      HEAD:   (x  ) Atraumatic      HEART:  Rate -->     (  x) normal rate     Rhythm -->     ( x ) regular   Murmurs -->     (x  ) normal s1s2    LUNGS:   (x  )Unlabored respirations   ( x ) B/L air entry     )    (x  ) no adventitious sound       ABDOMEN:   (x  ) Soft     (x  ) nondistended   (x  ) +BS  ( x ) nontender     EXTREMITIES:  L heel ulcer, wrapped in ACE bandage     NERVOUS SYSTEM:    (x  ) A&Ox3      LABS:                        13.8   10.18 )-----------( 306      ( 16 Aug 2023 17:43 )             42.9     08-16    142  |  105  |  11  ----------------------------<  91  4.5   |  22  |  0.7    Ca    9.9      16 Aug 2023 17:43  Mg     2.0     08-16      PT/INR - ( 16 Aug 2023 17:43 )   PT: 11.70 sec;   INR: 1.03 ratio         PTT - ( 16 Aug 2023 17:43 )  PTT:34.4 sec  Urinalysis Basic - ( 16 Aug 2023 17:43 )    Color: x / Appearance: x / SG: x / pH: x  Gluc: 91 mg/dL / Ketone: x  / Bili: x / Urobili: x   Blood: x / Protein: x / Nitrite: x   Leuk Esterase: x / RBC: x / WBC x   Sq Epi: x / Non Sq Epi: x / Bacteria: x            Culture - Fungal, Other (collected 14 Aug 2023 20:00)  Source: .Other None  Preliminary Report (16 Aug 2023 15:03):    Culture is being performed. Fungal cultures are held for 4 weeks.    Culture - Fungal, Other (collected 14 Aug 2023 20:00)  Source: .Other None  Preliminary Report (17 Aug 2023 10:43):    Few Yeast    Culture - Surgical Swab (collected 14 Aug 2023 20:00)  Source: .Surgical Swab None  Preliminary Report (15 Aug 2023 21:45):    Few Escherichia coli    Few Serratia marcescens    Moderate Staphylococcus epidermidis    Few Staphylococcus aureus  Organism: Escherichia coli  Serratia marcescens  Staphylococcus epidermidis  Staphylococcus aureus (16 Aug 2023 19:16)  Organism: Staphylococcus epidermidis (16 Aug 2023 19:16)  Organism: Staphylococcus aureus (16 Aug 2023 17:57)  Organism: Serratia marcescens (16 Aug 2023 17:57)  Organism: Escherichia coli (16 Aug 2023 17:56)    Culture - Surgical Swab (collected 14 Aug 2023 20:00)  Source: .Surgical Swab None  Preliminary Report (15 Aug 2023 21:42):    Rare Escherichia coli  Organism: Escherichia coli (16 Aug 2023 18:05)  Organism: Escherichia coli (16 Aug 2023 18:05)          RADIOLOGY:    L foot xray IMPRESSION:  1.  Status post Lisfranc joint complex ORIF without evidence of   complication.    2.  Skin irregularity at the posterior aspect of the heel and bandage.   Ulcer is not excluded. No definite subjacent osteomyelitis is seen

## 2023-08-17 NOTE — PROGRESS NOTE ADULT - SUBJECTIVE AND OBJECTIVE BOX
Patient is a 28y old Female who presents with a chief complaint of left heel pressure ulcer (15 Aug 2023 12:01)    28 yr old f w/ a pmh significant for neurogenic bladder and anxiety who presents for left heel pressure ulcer. At baseline, AO4, completely functional.    Patient had arthrodesis of comminuted LisFranc fx on 6/23/23, had a splint for 1 week after which she developed left heel pressure ulcer. The ulcer is not improving but is also not worsening. Patient tried 5 days of bactrim (today day 5) without any change in appearance of ulcer. As per patient no purulent drainage or pain at rest. Patient endorse some tenderness to palpation and mild surrounding erythema which is not worsening. Denies any numbness, tingling, swelling. Denies HA, dizziness, NVD, fever, SOB, chest pain, abdominal pain, change in urination and change in bowel habits.        Patient seen and examined at bedside this morning  s/p OR with burn today - skin graft not performed; wound vac placed    ALLERGIES:  No Known Allergies    Vital Signs Last 24 Hrs  T(C): 36.5 (17 Aug 2023 08:51), Max: 36.9 (17 Aug 2023 05:54)  T(F): 97.7 (17 Aug 2023 08:51), Max: 98.4 (17 Aug 2023 05:54)  HR: 82 (17 Aug 2023 08:51) (63 - 84)  BP: 132/87 (17 Aug 2023 08:51) (114/76 - 143/88)  BP(mean): --  RR: 14 (17 Aug 2023 08:51) (14 - 19)  SpO2: 98% (17 Aug 2023 08:51) (97% - 99%)    Parameters below as of 17 Aug 2023 08:21  Patient On (Oxygen Delivery Method): room air        PHYSICAL EXAM:  General: NAD, A/O x 3  ENT: MMM  Neck: Supple, No JVD  Lungs: Clear to auscultation bilaterally  Cardio: RRR, S1/S2, No murmurs  Abdomen: Soft, Nontender, Nondistended; Bowel sounds present, obese  Extremities: No cyanosis, left foot in dressing, wound vac +    LABS:                                   13.8   10.18 )-----------( 306      ( 16 Aug 2023 17:43 )             42.9     08-16    142  |  105  |  11  ----------------------------<  91  4.5   |  22  |  0.7    Ca    9.9      16 Aug 2023 17:43  Mg     2.0     08-16      Culture - Fungal, Other (collected 14 Aug 2023 20:00)  Source: .Other None  Preliminary Report (16 Aug 2023 06:56):    Testing in progress    Culture - Surgical Swab (collected 14 Aug 2023 20:00)  Source: .Surgical Swab None  Preliminary Report (15 Aug 2023 21:42):    Rare Escherichia coli    Culture - Surgical Swab (collected 14 Aug 2023 20:00)  Source: .Surgical Swab None  Preliminary Report (15 Aug 2023 21:45):    Few Escherichia coli    Few Serratia marcescens    Moderate Staphylococcus epidermidis    Few Staphylococcus aureus    Culture - Fungal, Other (collected 14 Aug 2023 20:00)  Source: .Other None  Preliminary Report (16 Aug 2023 06:55):    Testing in progress    Culture - Blood (collected 13 Aug 2023 15:46)  Source: .Blood Blood-Peripheral  Preliminary Report (16 Aug 2023 01:01):    No growth at 48 Hours    Culture - Blood (collected 13 Aug 2023 15:46)  Source: .Blood Blood-Peripheral  Preliminary Report (16 Aug 2023 01:01):    No growth at 48 Hours          RADIOLOGY & ADDITIONAL TESTS:  Care Discussed with Consultants/Other Providers: x        MEDICATIONS  (STANDING):  cefTRIAXone   IVPB 1000 milliGRAM(s) IV Intermittent every 24 hours  enoxaparin Injectable 40 milliGRAM(s) SubCutaneous every 24 hours  melatonin 3 milliGRAM(s) Oral at bedtime  oxycodone    5 mG/acetaminophen 325 mG 1 Tablet(s) Oral daily    MEDICATIONS  (PRN):  hydrOXYzine hydrochloride 50 milliGRAM(s) Oral at bedtime PRN Anxiety

## 2023-08-17 NOTE — PROGRESS NOTE ADULT - ASSESSMENT
28 yr old f w/ a pmh significant for neurogenic bladder and anxiety who presents for left heel pressure ulcer. Patient had arthrodesis of comminuted LisFranc fx on 6/23/23, had a splint for 1 week after which she developed left heel pressure ulcer. Patient tried 5 days of bactrim without any change in appearance of ulcer. S/p debridement on 8/14, s/p attempt for skin graft on 8/17 that did not occur bc tissue not vascularized enough yet.     #L heel pressure ulcer  -s/p left foot arthrodesis on 6/23, pressure ulcer found on cast removal follow up appt with Dr. Ferrari   -s/p debridement on 8/14  s/p attempt for skin graft on 8/17 that did not occur bc tissue not vascularized enough yet.   -no need for antibiotics  -venous and arterial duplex normal 8/13  -X ray negative for OM  -wound culture from debridement:: few E coli, few Serratia marcescens, moderate Staph epidermidis, few Staph aureus  Plan:  -Local wound care   -Percocet 5/325mg once daily for pain  -ceftriaxone 1g daily started today    -Per Burn: no further inpatient intervention. Pt cleared for discharge from their standpoint with OP fu in 1 week   -pending Burn team to do paperwork for wound vac for discharge     #Anxiety  -c/w Hydroxyzine 50mg HS PRN  -Patient says she takes clonidine PRN for anxiety- Monitor off for now    Code Status: FULL CODE  DVT prophylaxis: lovenox  GI prophylaxis: none  Diet: DASH  Pending: discharge to home 8/18   Disposition: Home___X__/SNF______/Other_____/Unknown at this time_____

## 2023-08-17 NOTE — BRIEF OPERATIVE NOTE - NSICDXBRIEFPOSTOP_GEN_ALL_CORE_FT
POST-OP DIAGNOSIS:  Pressure ulcer, heel, left, unstageable 14-Aug-2023 14:07:53  Maria M Ferrari  
POST-OP DIAGNOSIS:  Wound 17-Aug-2023 21:59:27 left heel Everardo Mcghee

## 2023-08-17 NOTE — BRIEF OPERATIVE NOTE - NSICDXBRIEFPREOP_GEN_ALL_CORE_FT
PRE-OP DIAGNOSIS:  Unstageable pressure ulcer of left heel 14-Aug-2023 14:07:03  PecoMaria M  Wound 17-Aug-2023 21:59:06 left heel Everardo Mcghee  
PRE-OP DIAGNOSIS:  Unstageable pressure ulcer of left heel 14-Aug-2023 14:07:03  Maria M Ferrari

## 2023-08-17 NOTE — PRE-ANESTHESIA EVALUATION ADULT - NSANTHOSAYNRD_GEN_A_CORE
No. COURTNEY screening performed.  STOP BANG Legend: 0-2 = LOW Risk; 3-4 = INTERMEDIATE Risk; 5-8 = HIGH Risk
No. COURTNEY screening performed.  STOP BANG Legend: 0-2 = LOW Risk; 3-4 = INTERMEDIATE Risk; 5-8 = HIGH Risk

## 2023-08-17 NOTE — PRE-ANESTHESIA EVALUATION ADULT - BSA (M2)
The patient was signed out to me by Dr. Arin Powers at the end of her shift for follow-up on labs and imaging results.  The sign-out included relevant details of the history, physical, and work-up to date. The chart has been reviewed, new test results have been noted, and the patient has been re-evaluated.      Labs  Results for orders placed or performed during the hospital encounter of 10/18/21   Comprehensive Metabolic Panel   Result Value    Fasting Status     Sodium 139    Potassium 4.9    Chloride 105    Carbon Dioxide 25    Anion Gap 14    Glucose 124 (H)    BUN 32 (H)    Creatinine 1.68 (H)    Glomerular Filtration Rate 28 (L)     Comment: eGFR 15 - 29 mL/min/1.73 m2 = Severe decrease in kidney function. Stage 4 CKD (chronic kidney disease), or severe kidney disease. Estimated GFR calculated using the 2009 CKD-EPI creatinine equation.    BUN/ Creatinine Ratio 19    Calcium 9.3    Bilirubin, Total 0.3    GOT/AST 14    GPT/ALT 14    Alkaline Phosphatase 122 (H)    Albumin 3.1 (L)    Protein, Total 7.5    Globulin 4.4 (H)    A/G Ratio 0.7 (L)   Troponin I Ultra Sensitive   Result Value    Troponin I, Ultra Sensitive <0.02   Prothrombin Time   Result Value    Prothrombin Time 11.2    INR 1.1     Comment: INR Therapeutic Range: 2.0 to 3.0 (2.5 to 3.5 recommended for recurrent thrombotic episodes and mechanical prosthetic heart valves.)   CBC with Automated Differential (performable only)   Result Value    WBC 14.9 (H)    RBC 3.97 (L)    HGB 11.9 (L)    HCT 37.3    MCV 94.0    MCH 30.0    MCHC 31.9 (L)    RDW-CV 14.5    RDW-SD 50.4 (H)        NRBC 0    Neutrophil, Percent 84    Lymphocytes, Percent 10    Mono, Percent 5    Eosinophils, Percent 0    Basophils, Percent 1    Immature Granulocytes 0    Absolute Neutrophils 12.4 (H)    Absolute Lymphocytes 1.5    Absolute Monocytes 0.8    Absolute Eosinophils  0.1    Absolute Basophils 0.1    Absolute Immmature Granulocytes 0.1   Lipase   Result Value    
1.97
Lipase 54 (L)   Urinalysis & Reflex Microscopy With Culture If Indicated   Result Value    COLOR, URINALYSIS Yellow    APPEARANCE, URINALYSIS Cloudy    GLUCOSE, URINALYSIS Negative    BILIRUBIN, URINALYSIS Negative    KETONES, URINALYSIS Negative    SPECIFIC GRAVITY, URINALYSIS 1.025    OCCULT BLOOD, URINALYSIS Negative    PH, URINALYSIS 6.0    PROTEIN, URINALYSIS Trace (A)    UROBILINOGEN, URINALYSIS 0.2    NITRITE, URINALYSIS Positive (A)    LEUKOCYTE ESTERASE, URINALYSIS Small (A)    SQUAMOUS EPITHELIAL, URINALYSIS 1 to 5    ERYTHROCYTES, URINALYSIS 1 to 2    LEUKOCYTES, URINALYSIS 11 to 25 (A)    BACTERIA, URINALYSIS Large (A)    HYALINE CASTS, URINALYSIS 6 to 10 (A)   Electrocardiogram 12-Lead   Result Value    Systolic Blood Pressure 117    Diastolic Blood Pressure 67    Ventricular Rate EKG/Min (BPM) 99    Atrial Rate (BPM) 99    AK-Interval (MSEC) 270    QRS-Interval (MSEC) 134    QT-Interval (MSEC) 344    QTc 441    P Axis (Degrees) 78    R Axis (Degrees) 49    T Axis (Degrees) -157    REPORT TEXT      Sinus rhythm  with 1st degree AV block  LBBB-old  T wave abnormality, consider inferolateral ischemia  Abnormal ECG  When compared with ECG of  15-NOV-2018 16:02,  Questionable change in  QRS axis  T wave inversion now evident in  Inferior leads  Confirmed by DYLAN LEYVA MD ([id number]),  Darya Gage ([id number]) on 10/18/2021 5:52:18 AM     Rapid SARS-CoV-2 by PCR    Specimen: Nasopharyngeal; Swab   Result Value    Rapid SARS-COV-2 by PCR Not Detected    Isolation Guidelines      Comment: Do not use this test result as the sole decision-maker for discontinuation of isolation.   Clinical evaluation should be considered for other respiratory illness requiring transmission-based isolation.    -    No fever (<99.0 F/37.2 C) for at least 24 hours without the use of fever-reducing medications    AND  -    Respiratory symptoms have improved or resolved (e.g. cough, shortness of breath)     AND  -    COVID-19 
negative test    See COVID-19 Deisolation Resource Guide    Procedural Comment      Comment: SARS-CoV-2 nucleic acid has not been detected indicating the absence of COVID-19.       Testing was performed using the [employer] Xpert Xpress SARS-CoV-2 RT-PCR assay that has been given Emergency Use Authorization (EUA) by the [place] Food and Drug Administration (FDA).  These results are considered definitive and do not need to be confirmed by another method.   ISTAT8 VENOUS  POINT OF CARE   Result Value    BUN - POINT OF CARE 32 (H)    SODIUM - POINT OF CARE 141    POTASSIUM - POINT OF CARE 4.5    CHLORIDE - POINT OF CARE 104    TCO2 - POINT OF CARE 25 (H)    ANION GAP - POINT OF CARE 17    HEMATOCRIT - POINT OF CARE 36.0    HEMOGLOBIN - POINT OF CARE 12.2    GLUCOSE - POINT OF CARE 130 (H)    CALCIUM, IONIZED - POINT OF CARE 1.31 (H)    Creatinine 2.00 (H)    Glomerular Filtration Rate 23 (L)     Comment: eGFR 15 - 29 mL/min/1.73 m2 = Severe decrease in kidney function. Stage 4 CKD (chronic kidney disease), or severe kidney disease. Estimated GFR calculated using the 2009 CKD-EPI creatinine equation.   TROPONIN I  POINT OF CARE   Result Value    TROPONIN I - POINT OF CARE <0.10       Radiology  CT ABDOMEN PELVIS WO CONTRAST   Final Result   IMPRESSION:      1. No acute abdominopelvic process.   2. Cholelithiasis.   3. Simple appearing left renal cysts.   4. Moderate to severe atherosclerotic disease of the abdominal aorta.   5. Bibasilar groundglass opacities possibly atelectasis. Correlate for   infection or aspiration.         XR Chest AP or PA   Final Result   IMPRESSION:       No evidence for active disease in the chest.      I, Attending Radiologist Ana Maria Carrera MD, have reviewed the images   and report and concur with these findings interpreted by Resident   Radiologist, Cy Renae MD.           Medications  Medications   sodium chloride 0.9 % flush bag 25 mL (has no administration in time range) 
  sodium chloride (PF) 0.9 % injection 2 mL (has no administration in time range)   ondansetron (ZOFRAN) injection 4 mg (4 mg Intravenous Given 10/18/21 0605)   pantoprazole (PROTONIX INJECT) injection 40 mg (40 mg Intravenous Given 10/18/21 0606)   sodium chloride (NORMAL SALINE) 0.9 % bolus 500 mL (0 mLs Intravenous Completed 10/18/21 0638)       Vitals  Vitals:    10/18/21 0630 10/18/21 0725 10/18/21 0730 10/18/21 0810   BP: (!) 145/67 123/61 131/64 129/61   BP Location: RUE - Right upper extremity  RUE - Right upper extremity    Patient Position: Semi-Aguirre's  Semi-Aguirre's    Pulse: 100 (!) 129 (!) 117 (!) 111   Resp: 20 (!) 31 19 17   Temp:       TempSrc:       SpO2: 94% (!) 85% 94% 98%       ED Course       8:10 AM I spoke with BREN Pope regarding the patient's presentation and the ED work up.   Dr. Medina agrees with the plan of further care beyond the ED and will consult.      MDM  Critical Care time spent on this patient outside of billable procedures:  None    8:18 AM Does this patient meet Severe Sepsis criteria by CMS SEP-1 definition? No     8:18 AM Does this patient meet Septic Shock criteria by CMS SEP-1 definition? No      Clinical Impression:  ED Diagnoses        Final diagnoses    Nausea vomiting and diarrhea          Leukocytosis, unspecified type          Dark stools                  Pt to be admitted to Dr. Staples in stable condition.     I have reviewed the information recorded by the scribe for accuracy and agree with its contents.    ____________________________________________________________________    Merry Lion acting as a scribe for Dr. Jonn Silva  Dictation # [id number]  Scribe: Merry Silva MD  10/18/21 1258    
1.97

## 2023-08-18 LAB
NIGHT BLUE STAIN TISS: SIGNIFICANT CHANGE UP
NIGHT BLUE STAIN TISS: SIGNIFICANT CHANGE UP
SPECIMEN SOURCE: SIGNIFICANT CHANGE UP
SPECIMEN SOURCE: SIGNIFICANT CHANGE UP

## 2023-08-18 PROCEDURE — 99231 SBSQ HOSP IP/OBS SF/LOW 25: CPT

## 2023-08-18 PROCEDURE — 99231 SBSQ HOSP IP/OBS SF/LOW 25: CPT | Mod: 24

## 2023-08-18 PROCEDURE — 99232 SBSQ HOSP IP/OBS MODERATE 35: CPT

## 2023-08-18 RX ORDER — MAGNESIUM SULFATE 500 MG/ML
2 VIAL (ML) INJECTION ONCE
Refills: 0 | Status: DISCONTINUED | OUTPATIENT
Start: 2023-08-18 | End: 2023-08-20

## 2023-08-18 RX ORDER — CHLORHEXIDINE GLUCONATE 213 G/1000ML
1 SOLUTION TOPICAL
Refills: 0 | Status: DISCONTINUED | OUTPATIENT
Start: 2023-08-18 | End: 2023-08-20

## 2023-08-18 RX ADMIN — Medication 650 MILLIGRAM(S): at 00:11

## 2023-08-18 RX ADMIN — Medication 50 MILLIGRAM(S): at 17:49

## 2023-08-18 RX ADMIN — CEFTRIAXONE 100 MILLIGRAM(S): 500 INJECTION, POWDER, FOR SOLUTION INTRAMUSCULAR; INTRAVENOUS at 11:22

## 2023-08-18 RX ADMIN — Medication 3 MILLIGRAM(S): at 21:04

## 2023-08-18 RX ADMIN — Medication 650 MILLIGRAM(S): at 00:49

## 2023-08-18 NOTE — PROGRESS NOTE ADULT - SUBJECTIVE AND OBJECTIVE BOX
POD#1 s/p debridement of left heel +NPWT    seen with attending on AM rounds  no overnight events  afebrile    ICU Vital Signs Last 24 Hrs  T(C): 36.3 (18 Aug 2023 16:00), Max: 36.3 (18 Aug 2023 16:00)  T(F): 97.3 (18 Aug 2023 16:00), Max: 97.3 (18 Aug 2023 16:00)  HR: 69 (18 Aug 2023 16:00) (62 - 71)  BP: 114/76 (18 Aug 2023 16:00) (114/76 - 119/67  RR: 18 (18 Aug 2023 16:00) (18 - 18)  SpO2: 97% (18 Aug 2023 01:02) (97% - 97%)                          12.4   10.58 )-----------( 317      ( 17 Aug 2023 21:49 )             38.8     GENERAL: NAD, well-developed  HEAD:  Atraumatic, Normocephalic  CHEST/LUNG: Breathing comfortably on room air. No increased work of breathing noted.   HEART: In no acute cardiopulmonary distress   PSYCH: AAOx3  SKIN: Left heel: wound vac in place, good suction/seal
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
## 2023-08-18 NOTE — PROGRESS NOTE ADULT - ASSESSMENT
28 yr old f w/ a pmh significant for neurogenic bladder and anxiety who presents for left heel pressure ulcer. Patient had arthrodesis of comminuted LisFranc fx on 6/23/23, had a splint for 1 week after which she developed left heel pressure ulcer. Patient tried 5 days of bactrim without any change in appearance of ulcer. S/p debridement on 8/14, s/p attempt for skin graft on 8/17 that did not occur bc tissue not vascularized enough yet.     #L heel pressure ulcer  -s/p left foot arthrodesis on 6/23, pressure ulcer found on cast removal follow up appt with Dr. Ferrari   -s/p debridement on 8/14 & 8/17  s/p attempt for skin graft on 8/17 that did not occur bc tissue not vascularized enough yet.   -no need for antibiotics  -venous and arterial duplex normal 8/13  -X ray negative for OM  -wound culture from debridement:: few E coli, few Serratia marcescens, moderate Staph epidermidis, few Staph aureus  Plan:  -Local wound care   -Percocet 5/325mg once daily for pain  -ceftriaxone 1g daily started 8/17  -Per Burn: no further inpatient intervention. Pt cleared for discharge from their standpoint with OP fu in 1 week   -Burn team to do paperwork for wound vac for discharge; pending wound vac authorization    #Anxiety  -c/w Hydroxyzine 50mg HS PRN  -Patient says she takes clonidine PRN for anxiety- Monitor off for now    Code Status: FULL CODE  DVT prophylaxis: lovenox  GI prophylaxis: none  Diet: DASH  Pending: discharge to home 8/18   Disposition: Home___X__/SNF______/Other_____/Unknown at this time_____ 28 yr old f w/ a pmh significant for neurogenic bladder and anxiety who presents for left heel pressure ulcer. Patient had arthrodesis of comminuted LisFranc fx on 6/23/23, had a splint for 1 week after which she developed left heel pressure ulcer. Patient tried 5 days of bactrim without any change in appearance of ulcer. S/p debridement on 8/14, s/p attempt for skin graft on 8/17 that did not occur bc tissue not vascularized enough yet.     #L heel pressure ulcer  -s/p left foot arthrodesis on 6/23, pressure ulcer found on cast removal follow up appt with Dr. Ferrari   -s/p debridement on 8/14  s/p attempt for skin graft on 8/17 that did not occur bc tissue not vascularized enough yet, wound vac placed  -no need for antibiotics  -venous and arterial duplex normal 8/13  -X ray negative for OM  -wound culture from debridement:: few E coli, few Serratia marcescens, moderate Staph epidermidis, few Staph aureus  Plan:  -Local wound care   -Percocet 5/325mg once daily for pain  -ceftriaxone 1g daily started 8/17  -Per Burn: no further inpatient intervention. Pt cleared for discharge from their standpoint with OP fu in 1 week   -pending home wound vac authorization    #Anxiety  -c/w Hydroxyzine 50mg HS PRN  -Patient says she takes clonidine PRN for anxiety- Monitor off for now    Code Status: FULL CODE  DVT prophylaxis: lovenox  GI prophylaxis: none  Diet: DASH  Pending: discharge to home 8/18   Disposition: Home___X__/SNF______/Other_____/Unknown at this time_____ 28 yr old f w/ a pmh significant for neurogenic bladder and anxiety who presents for left heel pressure ulcer. Patient had arthrodesis of comminuted LisFranc fx on 6/23/23, had a splint for 1 week after which she developed left heel pressure ulcer. Patient tried 5 days of bactrim without any change in appearance of ulcer. S/p debridement on 8/14, s/p 2nd selective debridement and attempt for skin graft on 8/17. Pt is hemodynamically stable without signs of OM.     #L heel pressure ulcer  -s/p left foot arthrodesis on 6/23, pressure ulcer found on cast removal follow up appt with Dr. Ferrari   -s/p debridement on 8/14  s/p attempt for skin graft on 8/17 that did not occur bc tissue not vascularized enough yet, wound vac placed  -no need for antibiotics  -venous and arterial duplex normal 8/13  -X ray negative for OM  -wound culture from debridement:: few E coli, few Serratia marcescens, moderate Staph epidermidis, few Staph aureus  Plan:  -Local wound care   -Percocet 5/325mg once daily for pain  -ceftriaxone 1g daily started 8/17  -Per Burn: no further inpatient intervention. Pt cleared for discharge from their standpoint with OP fu in 1 week   -pending DC home wound vac authorization  -per burn and podiatry, weight bearing as tolerated with boot     #Anxiety  -c/w Hydroxyzine 50mg HS PRN  -Patient says she takes clonidine PRN for anxiety- Monitor off for now    Code Status: FULL CODE  DVT prophylaxis: lovenox  GI prophylaxis: none  Diet: DASH  Pending: pending wound vac authorization then DC home  Disposition: Home___X__/SNF______/Other_____/Unknown at this time_____

## 2023-08-18 NOTE — PROGRESS NOTE ADULT - SUBJECTIVE AND OBJECTIVE BOX
PROGRESS NOTE   Pt seen @ bedside during PM rounds. S/p Dbx L Heel. Wound Vac Dressing +Clean, + Intact      Vital Signs Last 24 Hrs  T(C): 36 (18 Aug 2023 07:23), Max: 36.4 (17 Aug 2023 15:37)  T(F): 96.8 (18 Aug 2023 07:23), Max: 97.6 (17 Aug 2023 15:37)  HR: 71 (18 Aug 2023 07:23) (62 - 71)  BP: 118/75 (18 Aug 2023 07:23) (118/75 - 128/61)  BP(mean): --  RR: 18 (18 Aug 2023 07:23) (18 - 18)  SpO2: 97% (18 Aug 2023 01:02) (97% - 98%)                              12.4   10.58 )-----------( 317      ( 17 Aug 2023 21:49 )             38.8               08-17    140  |  106  |  11  ----------------------------<  100<H>  4.3   |  22  |  0.6<L>    Ca    9.9      17 Aug 2023 21:49  Mg     1.7     08-17        Physical Exam - Lower Extremity Focused:   Derm: left heel full thickness wound L Heel  Vascular: DP and PT Pulses palpable; Foot is Warm to Warm to the touch; Capillary Refill Time < 3 Seconds;    Neuro: Protective Sensation intact  MSK: Mild Pain On Palpation at Wound Site     Assessment:  S/p soft tissue debridement of wound of the left heel with wound Vac application by Burn Team  S/p Alis fusion L foot     Plan:  Chart reviewed and Patient evaluated. All Questions and Concerns Addressed and Answered  Weight Bearing Status; WBAT in a CAM boot   Wound care as per Burn team - wound VAC   Off loading boot LLE   Stable for D/C from podiatry standpoint   F/u as o/p
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
## 2023-08-18 NOTE — PROGRESS NOTE ADULT - SUBJECTIVE AND OBJECTIVE BOX
Patient is a 28y old  Female who presents with a chief complaint of L foot ulcer (16 Aug 2023 12:39)      INTERVAL HPI/OVERNIGHT EVENTS:  Pt was examined at bedside. No overnight events. Pt has no complaints    REVIEW OF SYSTEMS:  CONSTITUTIONAL: No fever, weight loss, or fatigue  RESPIRATORY: No cough, wheezing, chills or hemoptysis; No shortness of breath  CARDIOVASCULAR: No chest pain, palpitations  GASTROINTESTINAL: No abdominal pain. No nausea, vomiting; No diarrhea or constipation.      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      Tethered spinal cord  11/1993 SECONDARY TO CYSTIC TERATOMA      Galloway procedure  2006          FAMILY HISTORY:  FH: heart disease (Father)        VITALS:  T(C): 36 (08-18-23 @ 07:23), Max: 36.4 (08-17-23 @ 15:37)  HR: 71 (08-18-23 @ 07:23) (62 - 71)  BP: 118/75 (08-18-23 @ 07:23) (118/75 - 128/61)  RR: 18 (08-18-23 @ 07:23) (18 - 18)  SpO2: 97% (08-18-23 @ 01:02) (97% - 98%)  Wt(kg): --Vital Signs Last 24 Hrs  T(C): 36 (18 Aug 2023 07:23), Max: 36.4 (17 Aug 2023 15:37)  T(F): 96.8 (18 Aug 2023 07:23), Max: 97.6 (17 Aug 2023 15:37)  HR: 71 (18 Aug 2023 07:23) (62 - 71)  BP: 118/75 (18 Aug 2023 07:23) (118/75 - 128/61)  BP(mean): --  RR: 18 (18 Aug 2023 07:23) (18 - 18)  SpO2: 97% (18 Aug 2023 01:02) (97% - 98%)        PHYSICAL EXAM:  GENERAL: NAD  NERVOUS SYSTEM:  Alert & Oriented X3  CHEST/LUNG: Clear to percussion bilaterally; No rales, rhonchi, wheezing, or rubs  HEART: Regular rate and rhythm; No murmurs, rubs, or gallops  ABDOMEN: Soft, Nontender, Nondistended; Bowel sounds present  EXTREMITIES: intact wound vac on L heel    Consultant(s) Notes Reviewed:  [x ] YES  [ ] NO  Care Discussed with Consultants/Other Providers [ x] YES  [ ] NO    LABS:                        12.4   10.58 )-----------( 317      ( 17 Aug 2023 21:49 )             38.8     08-17    140  |  106  |  11  ----------------------------<  100<H>  4.3   |  22  |  0.6<L>    Ca    9.9      17 Aug 2023 21:49  Mg     1.7     08-17        RADIOLOGY & ADDITIONAL TESTS:      Imaging Personally Reviewed:  [ ] YES  [ ] NO    MEDICATIONS:  cefTRIAXone   IVPB 1000 milliGRAM(s) IV Intermittent every 24 hours  chlorhexidine 2% Cloths 1 Application(s) Topical <User Schedule>  enoxaparin Injectable 40 milliGRAM(s) SubCutaneous every 24 hours  hydrOXYzine hydrochloride 50 milliGRAM(s) Oral at bedtime PRN  magnesium sulfate  IVPB 2 Gram(s) IV Intermittent once  melatonin 3 milliGRAM(s) Oral at bedtime  oxycodone    5 mG/acetaminophen 325 mG 1 Tablet(s) Oral daily       Patient is a 28y old  Female who presents with a chief complaint of L foot ulcer (16 Aug 2023 12:39)      INTERVAL HPI/OVERNIGHT EVENTS:  Pt was examined at bedside. No overnight events. Pt has no complaints. Denies pain, numbness, tingling in L foot. Denies fever, dyspnea, chest pain, abdo pain, nausea, vomiting, diarrhea constipation.     REVIEW OF SYSTEMS:  CONSTITUTIONAL: No fever, weight loss, or fatigue  RESPIRATORY: No cough, wheezing, chills or hemoptysis; No shortness of breath  CARDIOVASCULAR: No chest pain, palpitations  GASTROINTESTINAL: No abdominal pain. No nausea, vomiting; No diarrhea or constipation.      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      Tethered spinal cord  11/1993 SECONDARY TO CYSTIC TERATOMA      Galloway procedure  2006          FAMILY HISTORY:  FH: heart disease (Father)        VITALS:  T(C): 36 (08-18-23 @ 07:23), Max: 36.4 (08-17-23 @ 15:37)  HR: 71 (08-18-23 @ 07:23) (62 - 71)  BP: 118/75 (08-18-23 @ 07:23) (118/75 - 128/61)  RR: 18 (08-18-23 @ 07:23) (18 - 18)  SpO2: 97% (08-18-23 @ 01:02) (97% - 98%)  Wt(kg): --Vital Signs Last 24 Hrs  T(C): 36 (18 Aug 2023 07:23), Max: 36.4 (17 Aug 2023 15:37)  T(F): 96.8 (18 Aug 2023 07:23), Max: 97.6 (17 Aug 2023 15:37)  HR: 71 (18 Aug 2023 07:23) (62 - 71)  BP: 118/75 (18 Aug 2023 07:23) (118/75 - 128/61)  BP(mean): --  RR: 18 (18 Aug 2023 07:23) (18 - 18)  SpO2: 97% (18 Aug 2023 01:02) (97% - 98%)        PHYSICAL EXAM:  GENERAL: NAD  NERVOUS SYSTEM:  Alert & Oriented X3  CHEST/LUNG: Clear to percussion bilaterally; No rales, rhonchi, wheezing, or rubs  HEART: Regular rate and rhythm; No murmurs, rubs, or gallops  ABDOMEN: Soft, Nontender, Nondistended; Bowel sounds present  EXTREMITIES: wound vac on L heel    Consultant(s) Notes Reviewed:  [x ] YES  [ ] NO  Care Discussed with Consultants/Other Providers [ x] YES  [ ] NO    LABS:                        12.4   10.58 )-----------( 317      ( 17 Aug 2023 21:49 )             38.8     08-17    140  |  106  |  11  ----------------------------<  100<H>  4.3   |  22  |  0.6<L>    Ca    9.9      17 Aug 2023 21:49  Mg     1.7     08-17        RADIOLOGY & ADDITIONAL TESTS:      Imaging Personally Reviewed:  [ ] YES  [ ] NO    MEDICATIONS:  cefTRIAXone   IVPB 1000 milliGRAM(s) IV Intermittent every 24 hours  chlorhexidine 2% Cloths 1 Application(s) Topical <User Schedule>  enoxaparin Injectable 40 milliGRAM(s) SubCutaneous every 24 hours  hydrOXYzine hydrochloride 50 milliGRAM(s) Oral at bedtime PRN  magnesium sulfate  IVPB 2 Gram(s) IV Intermittent once  melatonin 3 milliGRAM(s) Oral at bedtime  oxycodone    5 mG/acetaminophen 325 mG 1 Tablet(s) Oral daily

## 2023-08-18 NOTE — PROGRESS NOTE ADULT - ASSESSMENT
ASSESSMENT:  Full thickness Left heel wound    POD#1 s/p debridement of left heel + NPWT  - continue wound vac, next change mon 8/21  - pain control

## 2023-08-19 LAB
-  AMPICILLIN/SULBACTAM: SIGNIFICANT CHANGE UP
-  CEFAZOLIN: SIGNIFICANT CHANGE UP
-  CLINDAMYCIN: SIGNIFICANT CHANGE UP
-  ERYTHROMYCIN: SIGNIFICANT CHANGE UP
-  GENTAMICIN: SIGNIFICANT CHANGE UP
-  OXACILLIN: SIGNIFICANT CHANGE UP
-  PENICILLIN: SIGNIFICANT CHANGE UP
-  RIFAMPIN: SIGNIFICANT CHANGE UP
-  TETRACYCLINE: SIGNIFICANT CHANGE UP
-  TRIMETHOPRIM/SULFAMETHOXAZOLE: SIGNIFICANT CHANGE UP
-  VANCOMYCIN: SIGNIFICANT CHANGE UP
CULTURE RESULTS: SIGNIFICANT CHANGE UP
METHOD TYPE: SIGNIFICANT CHANGE UP
ORGANISM # SPEC MICROSCOPIC CNT: SIGNIFICANT CHANGE UP
SPECIMEN SOURCE: SIGNIFICANT CHANGE UP

## 2023-08-19 PROCEDURE — 99232 SBSQ HOSP IP/OBS MODERATE 35: CPT

## 2023-08-19 RX ORDER — LEVOFLOXACIN 5 MG/ML
1 INJECTION, SOLUTION INTRAVENOUS
Qty: 5 | Refills: 0
Start: 2023-08-19 | End: 2023-08-23

## 2023-08-19 RX ORDER — OXYCODONE AND ACETAMINOPHEN 5; 325 MG/1; MG/1
1 TABLET ORAL
Qty: 10 | Refills: 0
Start: 2023-08-19

## 2023-08-19 RX ADMIN — CHLORHEXIDINE GLUCONATE 1 APPLICATION(S): 213 SOLUTION TOPICAL at 05:05

## 2023-08-19 RX ADMIN — Medication 3 MILLIGRAM(S): at 21:15

## 2023-08-19 RX ADMIN — Medication 50 MILLIGRAM(S): at 11:20

## 2023-08-19 RX ADMIN — CEFTRIAXONE 100 MILLIGRAM(S): 500 INJECTION, POWDER, FOR SOLUTION INTRAMUSCULAR; INTRAVENOUS at 11:21
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
## 2023-08-19 NOTE — PROGRESS NOTE ADULT - PROVIDER SPECIALTY LIST ADULT
Hospitalist
Podiatry
Burn
Hospitalist
Internal Medicine
Internal Medicine
Podiatry
Podiatry
Internal Medicine
Internal Medicine

## 2023-08-20 VITALS
HEART RATE: 98 BPM | SYSTOLIC BLOOD PRESSURE: 135 MMHG | DIASTOLIC BLOOD PRESSURE: 87 MMHG | RESPIRATION RATE: 18 BRPM | TEMPERATURE: 98 F | OXYGEN SATURATION: 99 %

## 2023-08-20 LAB
-  AMPHOTERICIN B: SIGNIFICANT CHANGE UP
-  ANIDULAFUNGIN: SIGNIFICANT CHANGE UP
-  CASPOFUNGIN: SIGNIFICANT CHANGE UP
-  FLUCONAZOLE: SIGNIFICANT CHANGE UP
-  MICAFUNGIN: SIGNIFICANT CHANGE UP
-  POSACONAZOLE: SIGNIFICANT CHANGE UP
-  VORICONAZOLE: SIGNIFICANT CHANGE UP
METHOD TYPE: SIGNIFICANT CHANGE UP

## 2023-08-20 PROCEDURE — 99232 SBSQ HOSP IP/OBS MODERATE 35: CPT

## 2023-08-20 RX ADMIN — Medication 50 MILLIGRAM(S): at 00:04

## 2023-08-20 RX ADMIN — CHLORHEXIDINE GLUCONATE 1 APPLICATION(S): 213 SOLUTION TOPICAL at 05:52

## 2023-08-20 RX ADMIN — CEFTRIAXONE 100 MILLIGRAM(S): 500 INJECTION, POWDER, FOR SOLUTION INTRAMUSCULAR; INTRAVENOUS at 11:35

## 2023-08-22 LAB
CULTURE RESULTS: SIGNIFICANT CHANGE UP
CULTURE RESULTS: SIGNIFICANT CHANGE UP
ORGANISM # SPEC MICROSCOPIC CNT: SIGNIFICANT CHANGE UP
SPECIMEN SOURCE: SIGNIFICANT CHANGE UP
SPECIMEN SOURCE: SIGNIFICANT CHANGE UP

## 2023-08-29 DIAGNOSIS — T88.8XXA OTHER SPECIFIED COMPLICATIONS OF SURGICAL AND MEDICAL CARE, NOT ELSEWHERE CLASSIFIED, INITIAL ENCOUNTER: ICD-10-CM

## 2023-08-29 DIAGNOSIS — I96 GANGRENE, NOT ELSEWHERE CLASSIFIED: ICD-10-CM
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
## 2023-08-29 DIAGNOSIS — F41.9 ANXIETY DISORDER, UNSPECIFIED: ICD-10-CM
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
## 2023-08-29 DIAGNOSIS — S93.622S SPRAIN OF TARSOMETATARSAL LIGAMENT OF LEFT FOOT, SEQUELA: ICD-10-CM

## 2023-08-29 DIAGNOSIS — E66.09 OTHER OBESITY DUE TO EXCESS CALORIES: ICD-10-CM

## 2023-08-29 DIAGNOSIS — L89.620 PRESSURE ULCER OF LEFT HEEL, UNSTAGEABLE: ICD-10-CM

## 2023-08-29 DIAGNOSIS — G95.89 OTHER SPECIFIED DISEASES OF SPINAL CORD: ICD-10-CM

## 2023-08-29 DIAGNOSIS — L03.116 CELLULITIS OF LEFT LOWER LIMB: ICD-10-CM

## 2023-08-29 DIAGNOSIS — K59.2 NEUROGENIC BOWEL, NOT ELSEWHERE CLASSIFIED: ICD-10-CM
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
## 2023-08-31 ENCOUNTER — APPOINTMENT (OUTPATIENT)
Dept: BURN CARE | Facility: CLINIC | Age: 28
End: 2023-08-31
Payer: COMMERCIAL

## 2023-08-31 ENCOUNTER — OUTPATIENT (OUTPATIENT)
Dept: OUTPATIENT SERVICES | Facility: HOSPITAL | Age: 28
LOS: 1 days | End: 2023-08-31
Payer: COMMERCIAL

## 2023-08-31 VITALS — SYSTOLIC BLOOD PRESSURE: 124 MMHG | HEART RATE: 86 BPM | TEMPERATURE: 98.1 F | DIASTOLIC BLOOD PRESSURE: 76 MMHG

## 2023-08-31 DIAGNOSIS — Z00.8 ENCOUNTER FOR OTHER GENERAL EXAMINATION: ICD-10-CM

## 2023-08-31 PROCEDURE — 87070 CULTURE OTHR SPECIMN AEROBIC: CPT

## 2023-08-31 PROCEDURE — 99212 OFFICE O/P EST SF 10 MIN: CPT
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
## 2023-08-31 NOTE — ASSESSMENT
[FreeTextEntry1] : The left heel pressure sore measures 5x3cm and is granulating with areas adherent devitalized tissue .  The patient was instructed to clean  the wound with soap and water. Continue local wound care. and wound vac .  Follow up 1 week.  [Wound Care] : wound care

## 2023-08-31 NOTE — PHYSICAL EXAM
[Healing] : healing [Size%: ______] : Size: [unfilled]% [Infected?] : Infected: No [3] : 3 out of 10 [Abnormal] : abnormal [Medium] : medium [] : no [de-identified] : The left heel pressure sore measures 5x3cm and is granulating with areas adherent devitalized tissue .  The patient was instructed to clean  the wound with soap and water. Continue local wound care. and wound vac .  Follow up 1 week.  [TWNoteComboBox1] : xeroform

## 2023-09-01 DIAGNOSIS — L89.629 PRESSURE ULCER OF LEFT HEEL, UNSPECIFIED STAGE: ICD-10-CM

## 2023-09-01 DIAGNOSIS — L89.620 PRESSURE ULCER OF LEFT HEEL, UNSTAGEABLE: ICD-10-CM

## 2023-09-02 LAB — BACTERIA SPEC CULT: NORMAL

## 2023-09-05 ENCOUNTER — APPOINTMENT (OUTPATIENT)
Dept: PODIATRY | Facility: CLINIC | Age: 28
End: 2023-09-05
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
## 2023-09-07 ENCOUNTER — APPOINTMENT (OUTPATIENT)
Dept: BURN CARE | Facility: CLINIC | Age: 28
End: 2023-09-07
Payer: COMMERCIAL

## 2023-09-07 ENCOUNTER — OUTPATIENT (OUTPATIENT)
Dept: OUTPATIENT SERVICES | Facility: HOSPITAL | Age: 28
LOS: 1 days | End: 2023-09-07
Payer: COMMERCIAL

## 2023-09-07 VITALS — HEART RATE: 76 BPM | TEMPERATURE: 97.3 F | SYSTOLIC BLOOD PRESSURE: 116 MMHG | DIASTOLIC BLOOD PRESSURE: 80 MMHG

## 2023-09-07 DIAGNOSIS — Z00.8 ENCOUNTER FOR OTHER GENERAL EXAMINATION: ICD-10-CM

## 2023-09-07 PROCEDURE — 99212 OFFICE O/P EST SF 10 MIN: CPT
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
## 2023-09-13 LAB
CULTURE RESULTS: SIGNIFICANT CHANGE UP
CULTURE RESULTS: SIGNIFICANT CHANGE UP
SPECIMEN SOURCE: SIGNIFICANT CHANGE UP
SPECIMEN SOURCE: SIGNIFICANT CHANGE UP

## 2023-09-14 ENCOUNTER — APPOINTMENT (OUTPATIENT)
Dept: PODIATRY | Facility: CLINIC | Age: 28
End: 2023-09-14
Payer: MEDICAID

## 2023-09-14 DIAGNOSIS — L89.629 PRESSURE ULCER OF LEFT HEEL, UNSPECIFIED STAGE: ICD-10-CM

## 2023-09-14 DIAGNOSIS — L89.620 PRESSURE ULCER OF LEFT HEEL, UNSTAGEABLE: ICD-10-CM

## 2023-09-14 DIAGNOSIS — L03.116 CELLULITIS OF LEFT LOWER LIMB: ICD-10-CM

## 2023-09-14 PROCEDURE — 11042 DBRDMT SUBQ TIS 1ST 20SQCM/<: CPT | Mod: 58

## 2023-09-21 ENCOUNTER — APPOINTMENT (OUTPATIENT)
Dept: BURN CARE | Facility: CLINIC | Age: 28
End: 2023-09-21
Payer: COMMERCIAL

## 2023-09-21 ENCOUNTER — OUTPATIENT (OUTPATIENT)
Dept: OUTPATIENT SERVICES | Facility: HOSPITAL | Age: 28
LOS: 1 days | End: 2023-09-21
Payer: COMMERCIAL

## 2023-09-21 VITALS — TEMPERATURE: 98.2 F | SYSTOLIC BLOOD PRESSURE: 118 MMHG | HEART RATE: 79 BPM | DIASTOLIC BLOOD PRESSURE: 80 MMHG

## 2023-09-21 DIAGNOSIS — Z00.8 ENCOUNTER FOR OTHER GENERAL EXAMINATION: ICD-10-CM

## 2023-09-21 DIAGNOSIS — L89.620 PRESSURE ULCER OF LEFT HEEL, UNSTAGEABLE: ICD-10-CM

## 2023-09-21 DIAGNOSIS — L89.629 PRESSURE ULCER OF LEFT HEEL, UNSPECIFIED STAGE: ICD-10-CM

## 2023-09-21 PROCEDURE — 87070 CULTURE OTHR SPECIMN AEROBIC: CPT

## 2023-09-21 PROCEDURE — 99212 OFFICE O/P EST SF 10 MIN: CPT

## 2023-09-21 PROCEDURE — 87186 SC STD MICRODIL/AGAR DIL: CPT

## 2023-09-21 PROCEDURE — 87077 CULTURE AEROBIC IDENTIFY: CPT

## 2023-09-24 LAB — BACTERIA SPEC CULT: ABNORMAL

## 2023-10-03 RX ORDER — CLINDAMYCIN HYDROCHLORIDE 300 MG/1
300 CAPSULE ORAL EVERY 8 HOURS
Qty: 21 | Refills: 0 | Status: ACTIVE | COMMUNITY
Start: 2023-10-03 | End: 1900-01-01

## 2023-10-06 NOTE — ED ADULT NURSE NOTE - CAS ELECT INFOMATION PROVIDED
Immunizations Administered       Name Date Dose Route    Influenza, AFLURIA (age 1 yrs+), FLUZONE, (age 10 mo+), MDV, 0.5mL 10/6/2023 0.5 mL Intramuscular    Site: Deltoid- Left    Lot: [id number]    NDC: 79576-624-97
DC instructions

## 2023-10-12 ENCOUNTER — APPOINTMENT (OUTPATIENT)
Dept: BURN CARE | Facility: CLINIC | Age: 28
End: 2023-10-12
Payer: COMMERCIAL

## 2023-10-12 ENCOUNTER — OUTPATIENT (OUTPATIENT)
Dept: OUTPATIENT SERVICES | Facility: HOSPITAL | Age: 28
LOS: 1 days | End: 2023-10-12
Payer: COMMERCIAL

## 2023-10-12 DIAGNOSIS — Z00.8 ENCOUNTER FOR OTHER GENERAL EXAMINATION: ICD-10-CM

## 2023-10-12 PROCEDURE — 87070 CULTURE OTHR SPECIMN AEROBIC: CPT

## 2023-10-12 PROCEDURE — 99212 OFFICE O/P EST SF 10 MIN: CPT

## 2023-10-12 PROCEDURE — 87077 CULTURE AEROBIC IDENTIFY: CPT

## 2023-10-12 PROCEDURE — 87186 SC STD MICRODIL/AGAR DIL: CPT

## 2023-10-15 LAB — BACTERIA SPEC CULT: ABNORMAL

## 2023-10-16 DIAGNOSIS — L89.629 PRESSURE ULCER OF LEFT HEEL, UNSPECIFIED STAGE: ICD-10-CM

## 2023-10-16 DIAGNOSIS — L89.620 PRESSURE ULCER OF LEFT HEEL, UNSTAGEABLE: ICD-10-CM

## 2023-10-17 RX ORDER — DOXYCYCLINE HYCLATE 100 MG/1
100 CAPSULE ORAL
Qty: 14 | Refills: 0 | Status: ACTIVE | COMMUNITY
Start: 2023-10-17 | End: 1900-01-01

## 2023-10-17 RX ORDER — SULFAMETHOXAZOLE AND TRIMETHOPRIM 800; 160 MG/1; MG/1
800-160 TABLET ORAL TWICE DAILY
Qty: 14 | Refills: 0 | Status: ACTIVE | COMMUNITY
Start: 2023-10-17 | End: 1900-01-01

## 2023-10-19 ENCOUNTER — APPOINTMENT (OUTPATIENT)
Dept: PODIATRY | Facility: CLINIC | Age: 28
End: 2023-10-19
Payer: MEDICAID

## 2023-10-19 ENCOUNTER — RESULT REVIEW (OUTPATIENT)
Age: 28
End: 2023-10-19

## 2023-10-19 ENCOUNTER — OUTPATIENT (OUTPATIENT)
Dept: OUTPATIENT SERVICES | Facility: HOSPITAL | Age: 28
LOS: 1 days | End: 2023-10-19
Payer: MEDICAID

## 2023-10-19 VITALS
WEIGHT: 220 LBS | OXYGEN SATURATION: 98 % | BODY MASS INDEX: 40.48 KG/M2 | HEIGHT: 62 IN | TEMPERATURE: 97 F | HEART RATE: 80 BPM

## 2023-10-19 DIAGNOSIS — M79.672 PAIN IN LEFT FOOT: ICD-10-CM

## 2023-10-19 DIAGNOSIS — L97.422 NON-PRESSURE CHRONIC ULCER OF LEFT HEEL AND MIDFOOT WITH FAT LAYER EXPOSED: ICD-10-CM

## 2023-10-19 PROCEDURE — 73630 X-RAY EXAM OF FOOT: CPT | Mod: 26,LT

## 2023-10-19 PROCEDURE — 73630 X-RAY EXAM OF FOOT: CPT | Mod: LT

## 2023-10-19 PROCEDURE — 11042 DBRDMT SUBQ TIS 1ST 20SQCM/<: CPT

## 2023-10-20 DIAGNOSIS — M79.672 PAIN IN LEFT FOOT: ICD-10-CM

## 2023-10-26 ENCOUNTER — APPOINTMENT (OUTPATIENT)
Dept: BURN CARE | Facility: CLINIC | Age: 28
End: 2023-10-26
Payer: COMMERCIAL

## 2023-10-26 ENCOUNTER — OUTPATIENT (OUTPATIENT)
Dept: OUTPATIENT SERVICES | Facility: HOSPITAL | Age: 28
LOS: 1 days | End: 2023-10-26
Payer: COMMERCIAL

## 2023-10-26 DIAGNOSIS — Z00.8 ENCOUNTER FOR OTHER GENERAL EXAMINATION: ICD-10-CM

## 2023-10-26 PROCEDURE — 87077 CULTURE AEROBIC IDENTIFY: CPT

## 2023-10-26 PROCEDURE — 99212 OFFICE O/P EST SF 10 MIN: CPT

## 2023-10-26 PROCEDURE — 87070 CULTURE OTHR SPECIMN AEROBIC: CPT

## 2023-10-26 PROCEDURE — 87186 SC STD MICRODIL/AGAR DIL: CPT

## 2023-10-28 LAB — BACTERIA SPEC CULT: ABNORMAL

## 2023-10-31 DIAGNOSIS — L89.629 PRESSURE ULCER OF LEFT HEEL, UNSPECIFIED STAGE: ICD-10-CM

## 2023-10-31 DIAGNOSIS — L89.620 PRESSURE ULCER OF LEFT HEEL, UNSTAGEABLE: ICD-10-CM

## 2023-11-02 RX ORDER — CIPROFLOXACIN HYDROCHLORIDE 500 MG/1
500 TABLET, FILM COATED ORAL
Qty: 14 | Refills: 0 | Status: ACTIVE | COMMUNITY
Start: 2023-11-02 | End: 1900-01-01

## 2023-11-16 ENCOUNTER — APPOINTMENT (OUTPATIENT)
Dept: PODIATRY | Facility: CLINIC | Age: 28
End: 2023-11-16
Payer: MEDICAID

## 2023-11-16 ENCOUNTER — APPOINTMENT (OUTPATIENT)
Dept: BURN CARE | Facility: CLINIC | Age: 28
End: 2023-11-16
Payer: MEDICAID

## 2023-11-16 ENCOUNTER — OUTPATIENT (OUTPATIENT)
Dept: OUTPATIENT SERVICES | Facility: HOSPITAL | Age: 28
LOS: 1 days | End: 2023-11-16
Payer: MEDICAID

## 2023-11-16 VITALS — BODY MASS INDEX: 38.46 KG/M2 | HEIGHT: 62 IN | WEIGHT: 209 LBS

## 2023-11-16 VITALS — DIASTOLIC BLOOD PRESSURE: 74 MMHG | HEART RATE: 72 BPM | SYSTOLIC BLOOD PRESSURE: 127 MMHG | TEMPERATURE: 98.7 F

## 2023-11-16 DIAGNOSIS — Z00.8 ENCOUNTER FOR OTHER GENERAL EXAMINATION: ICD-10-CM

## 2023-11-16 DIAGNOSIS — M79.672 PAIN IN LEFT FOOT: ICD-10-CM

## 2023-11-16 DIAGNOSIS — S91.302A UNSPECIFIED OPEN WOUND, LEFT FOOT, INITIAL ENCOUNTER: ICD-10-CM

## 2023-11-16 PROCEDURE — 99213 OFFICE O/P EST LOW 20 MIN: CPT

## 2023-11-16 PROCEDURE — 11042 DBRDMT SUBQ TIS 1ST 20SQCM/<: CPT

## 2023-11-16 PROCEDURE — 87077 CULTURE AEROBIC IDENTIFY: CPT

## 2023-11-16 PROCEDURE — 87186 SC STD MICRODIL/AGAR DIL: CPT

## 2023-11-16 PROCEDURE — 87070 CULTURE OTHR SPECIMN AEROBIC: CPT

## 2023-11-17 DIAGNOSIS — L89.629 PRESSURE ULCER OF LEFT HEEL, UNSPECIFIED STAGE: ICD-10-CM

## 2023-11-17 DIAGNOSIS — L89.620 PRESSURE ULCER OF LEFT HEEL, UNSTAGEABLE: ICD-10-CM

## 2023-11-21 LAB — BACTERIA SPEC CULT: ABNORMAL

## 2023-11-30 RX ORDER — AMOXICILLIN AND CLAVULANATE POTASSIUM 875; 125 MG/1; MG/1
875-125 TABLET, COATED ORAL
Qty: 14 | Refills: 0 | Status: ACTIVE | COMMUNITY
Start: 2023-11-30 | End: 1900-01-01

## 2024-01-11 ENCOUNTER — APPOINTMENT (OUTPATIENT)
Dept: BURN CARE | Facility: CLINIC | Age: 29
End: 2024-01-11

## 2025-06-30 ENCOUNTER — NON-APPOINTMENT (OUTPATIENT)
Age: 30
End: 2025-06-30

## 2025-07-01 ENCOUNTER — OUTPATIENT (OUTPATIENT)
Dept: OUTPATIENT SERVICES | Facility: HOSPITAL | Age: 30
LOS: 1 days | End: 2025-07-01
Payer: MEDICAID

## 2025-07-01 ENCOUNTER — APPOINTMENT (OUTPATIENT)
Dept: PODIATRY | Facility: CLINIC | Age: 30
End: 2025-07-01
Payer: MEDICAID

## 2025-07-01 ENCOUNTER — RESULT REVIEW (OUTPATIENT)
Age: 30
End: 2025-07-01

## 2025-07-01 DIAGNOSIS — M79.672 PAIN IN LEFT FOOT: ICD-10-CM

## 2025-07-01 PROCEDURE — 99213 OFFICE O/P EST LOW 20 MIN: CPT

## 2025-07-01 PROCEDURE — 73630 X-RAY EXAM OF FOOT: CPT | Mod: LT

## 2025-07-01 PROCEDURE — 73630 X-RAY EXAM OF FOOT: CPT | Mod: 26,LT

## 2025-07-02 DIAGNOSIS — M79.672 PAIN IN LEFT FOOT: ICD-10-CM
